# Patient Record
Sex: MALE | Race: WHITE | NOT HISPANIC OR LATINO | URBAN - METROPOLITAN AREA
[De-identification: names, ages, dates, MRNs, and addresses within clinical notes are randomized per-mention and may not be internally consistent; named-entity substitution may affect disease eponyms.]

---

## 2017-01-05 ENCOUNTER — INFUSION - HEALTHEAST (OUTPATIENT)
Dept: INFUSION THERAPY | Facility: HOSPITAL | Age: 82
End: 2017-01-05

## 2017-01-06 ENCOUNTER — COMMUNICATION - HEALTHEAST (OUTPATIENT)
Dept: CARDIOLOGY | Facility: CLINIC | Age: 82
End: 2017-01-06

## 2017-01-06 DIAGNOSIS — I51.89 DIASTOLIC DYSFUNCTION: ICD-10-CM

## 2017-01-09 ENCOUNTER — INFUSION - HEALTHEAST (OUTPATIENT)
Dept: INFUSION THERAPY | Facility: HOSPITAL | Age: 82
End: 2017-01-09

## 2017-01-09 DIAGNOSIS — C34.90 LUNG CANCER (H): ICD-10-CM

## 2017-02-14 ENCOUNTER — INFUSION - HEALTHEAST (OUTPATIENT)
Dept: INFUSION THERAPY | Facility: HOSPITAL | Age: 82
End: 2017-02-14

## 2017-02-14 DIAGNOSIS — C34.32 CANCER OF LOWER LOBE OF LEFT LUNG (H): ICD-10-CM

## 2017-03-14 ENCOUNTER — INFUSION - HEALTHEAST (OUTPATIENT)
Dept: INFUSION THERAPY | Facility: HOSPITAL | Age: 82
End: 2017-03-14

## 2017-03-14 DIAGNOSIS — C34.32 CANCER OF LOWER LOBE OF LEFT LUNG (H): ICD-10-CM

## 2017-03-30 ENCOUNTER — RECORDS - HEALTHEAST (OUTPATIENT)
Dept: LAB | Facility: CLINIC | Age: 82
End: 2017-03-30

## 2017-03-30 LAB
CHOLEST SERPL-MCNC: 151 MG/DL
FASTING STATUS PATIENT QL REPORTED: ABNORMAL
HDLC SERPL-MCNC: 32 MG/DL
LDLC SERPL CALC-MCNC: 67 MG/DL
TRIGL SERPL-MCNC: 258 MG/DL

## 2017-04-10 ENCOUNTER — HOSPITAL ENCOUNTER (OUTPATIENT)
Dept: CT IMAGING | Facility: HOSPITAL | Age: 82
Setting detail: RADIATION/ONCOLOGY SERIES
Discharge: STILL ADMITTED OR ADMINISTRATIVE | End: 2017-04-10
Attending: INTERNAL MEDICINE

## 2017-04-10 DIAGNOSIS — Z23 NEED FOR PNEUMOCOCCAL VACCINE: ICD-10-CM

## 2017-04-11 ENCOUNTER — AMBULATORY - HEALTHEAST (OUTPATIENT)
Dept: INFUSION THERAPY | Facility: HOSPITAL | Age: 82
End: 2017-04-11

## 2017-04-11 ENCOUNTER — OFFICE VISIT - HEALTHEAST (OUTPATIENT)
Dept: ONCOLOGY | Facility: HOSPITAL | Age: 82
End: 2017-04-11

## 2017-04-11 DIAGNOSIS — Z23 NEED FOR PNEUMOCOCCAL VACCINE: ICD-10-CM

## 2017-04-11 DIAGNOSIS — C34.32 CANCER OF LOWER LOBE OF LEFT LUNG (H): ICD-10-CM

## 2017-04-11 DIAGNOSIS — C34.31 MALIGNANT NEOPLASM OF LOWER LOBE, RIGHT BRONCHUS OR LUNG (H): ICD-10-CM

## 2017-04-11 ASSESSMENT — MIFFLIN-ST. JEOR: SCORE: 1324

## 2017-04-14 ENCOUNTER — COMMUNICATION - HEALTHEAST (OUTPATIENT)
Dept: ONCOLOGY | Facility: CLINIC | Age: 82
End: 2017-04-14

## 2017-04-14 ENCOUNTER — COMMUNICATION - HEALTHEAST (OUTPATIENT)
Dept: ONCOLOGY | Facility: HOSPITAL | Age: 82
End: 2017-04-14

## 2017-04-19 ENCOUNTER — COMMUNICATION - HEALTHEAST (OUTPATIENT)
Dept: ONCOLOGY | Facility: HOSPITAL | Age: 82
End: 2017-04-19

## 2017-04-28 ENCOUNTER — AMBULATORY - HEALTHEAST (OUTPATIENT)
Dept: CARDIOLOGY | Facility: CLINIC | Age: 82
End: 2017-04-28

## 2017-05-16 ENCOUNTER — COMMUNICATION - HEALTHEAST (OUTPATIENT)
Dept: CARDIOLOGY | Facility: CLINIC | Age: 82
End: 2017-05-16

## 2017-05-16 DIAGNOSIS — I50.30 DIASTOLIC HEART FAILURE (H): ICD-10-CM

## 2017-05-23 ENCOUNTER — INFUSION - HEALTHEAST (OUTPATIENT)
Dept: INFUSION THERAPY | Facility: HOSPITAL | Age: 82
End: 2017-05-23

## 2017-05-23 DIAGNOSIS — Z98.890 HISTORY OF VASCULAR ACCESS DEVICE: ICD-10-CM

## 2017-06-21 ENCOUNTER — COMMUNICATION - HEALTHEAST (OUTPATIENT)
Dept: ADMINISTRATIVE | Facility: HOSPITAL | Age: 82
End: 2017-06-21

## 2017-07-03 ENCOUNTER — COMMUNICATION - HEALTHEAST (OUTPATIENT)
Dept: ADMINISTRATIVE | Facility: HOSPITAL | Age: 82
End: 2017-07-03

## 2017-07-06 ENCOUNTER — AMBULATORY - HEALTHEAST (OUTPATIENT)
Dept: CARDIOLOGY | Facility: CLINIC | Age: 82
End: 2017-07-06

## 2017-07-07 ENCOUNTER — INFUSION - HEALTHEAST (OUTPATIENT)
Dept: INFUSION THERAPY | Facility: HOSPITAL | Age: 82
End: 2017-07-07

## 2017-07-07 DIAGNOSIS — C34.32 CANCER OF LOWER LOBE OF LEFT LUNG (H): ICD-10-CM

## 2017-07-10 ENCOUNTER — AMBULATORY - HEALTHEAST (OUTPATIENT)
Dept: CARDIOLOGY | Facility: CLINIC | Age: 82
End: 2017-07-10

## 2017-07-10 ENCOUNTER — OFFICE VISIT - HEALTHEAST (OUTPATIENT)
Dept: CARDIOLOGY | Facility: CLINIC | Age: 82
End: 2017-07-10

## 2017-07-10 DIAGNOSIS — I48.20 CHRONIC ATRIAL FIBRILLATION (H): ICD-10-CM

## 2017-07-10 ASSESSMENT — MIFFLIN-ST. JEOR: SCORE: 1349.85

## 2017-07-17 ENCOUNTER — RECORDS - HEALTHEAST (OUTPATIENT)
Dept: ADMINISTRATIVE | Facility: OTHER | Age: 82
End: 2017-07-17

## 2017-08-04 ENCOUNTER — COMMUNICATION - HEALTHEAST (OUTPATIENT)
Dept: ONCOLOGY | Facility: HOSPITAL | Age: 82
End: 2017-08-04

## 2017-08-21 ENCOUNTER — INFUSION - HEALTHEAST (OUTPATIENT)
Dept: INFUSION THERAPY | Facility: HOSPITAL | Age: 82
End: 2017-08-21

## 2017-08-21 DIAGNOSIS — Z98.890 HISTORY OF VASCULAR ACCESS DEVICE: ICD-10-CM

## 2017-09-13 ENCOUNTER — RECORDS - HEALTHEAST (OUTPATIENT)
Dept: ADMINISTRATIVE | Facility: OTHER | Age: 82
End: 2017-09-13

## 2017-09-26 ENCOUNTER — INFUSION - HEALTHEAST (OUTPATIENT)
Dept: INFUSION THERAPY | Facility: HOSPITAL | Age: 82
End: 2017-09-26

## 2017-09-26 DIAGNOSIS — Z98.890 HISTORY OF VASCULAR ACCESS DEVICE: ICD-10-CM

## 2017-10-06 ENCOUNTER — HOSPITAL ENCOUNTER (OUTPATIENT)
Dept: CT IMAGING | Facility: HOSPITAL | Age: 82
Setting detail: RADIATION/ONCOLOGY SERIES
Discharge: STILL ADMITTED OR ADMINISTRATIVE | End: 2017-10-06
Attending: INTERNAL MEDICINE

## 2017-10-06 ENCOUNTER — COMMUNICATION - HEALTHEAST (OUTPATIENT)
Dept: ONCOLOGY | Facility: HOSPITAL | Age: 82
End: 2017-10-06

## 2017-10-06 ENCOUNTER — HOSPITAL ENCOUNTER (OUTPATIENT)
Dept: CT IMAGING | Facility: HOSPITAL | Age: 82
Discharge: HOME | End: 2017-10-06
Attending: INTERNAL MEDICINE

## 2017-10-06 DIAGNOSIS — Z23 NEED FOR PNEUMOCOCCAL VACCINE: ICD-10-CM

## 2017-10-06 DIAGNOSIS — C34.31 MALIGNANT NEOPLASM OF LOWER LOBE, RIGHT BRONCHUS OR LUNG (H): ICD-10-CM

## 2017-10-10 ENCOUNTER — OFFICE VISIT - HEALTHEAST (OUTPATIENT)
Dept: ONCOLOGY | Facility: HOSPITAL | Age: 82
End: 2017-10-10

## 2017-10-10 ENCOUNTER — AMBULATORY - HEALTHEAST (OUTPATIENT)
Dept: INFUSION THERAPY | Facility: HOSPITAL | Age: 82
End: 2017-10-10

## 2017-10-10 DIAGNOSIS — Z23 NEED FOR PNEUMOCOCCAL VACCINE: ICD-10-CM

## 2017-10-10 DIAGNOSIS — C34.32 CANCER OF LOWER LOBE OF LEFT LUNG (H): ICD-10-CM

## 2017-10-10 DIAGNOSIS — C80.1 CANCER (H): ICD-10-CM

## 2017-10-10 DIAGNOSIS — C34.31 MALIGNANT NEOPLASM OF LOWER LOBE, RIGHT BRONCHUS OR LUNG (H): ICD-10-CM

## 2017-10-10 LAB
IGA SERPL-MCNC: 256 MG/DL (ref 65–400)
IGA SERPL-MCNC: 934 MG/DL (ref 700–1700)
IGM SERPL-MCNC: 239 MG/DL (ref 60–280)

## 2017-10-11 ENCOUNTER — COMMUNICATION - HEALTHEAST (OUTPATIENT)
Dept: ONCOLOGY | Facility: CLINIC | Age: 82
End: 2017-10-11

## 2017-10-11 LAB
ALBUMIN PERCENT: 60.1 % (ref 51–67)
ALBUMIN SERPL ELPH-MCNC: 4.2 G/DL (ref 3.2–4.7)
ALPHA 1 PERCENT: 2.7 % (ref 2–4)
ALPHA 2 PERCENT: 8.5 % (ref 5–13)
ALPHA1 GLOB SERPL ELPH-MCNC: 0.2 G/DL (ref 0.1–0.3)
ALPHA2 GLOB SERPL ELPH-MCNC: 0.6 G/DL (ref 0.4–0.9)
B-GLOBULIN SERPL ELPH-MCNC: 1.2 G/DL (ref 0.7–1.2)
BETA PERCENT: 17.8 % (ref 10–17)
GAMMA GLOB SERPL ELPH-MCNC: 0.8 G/DL (ref 0.6–1.4)
GAMMA GLOBULIN PERCENT: 10.9 % (ref 9–20)
KAPPA FREE LIGHT CHAIN, S - HISTORICAL: 2.39 MG/DL (ref 0.33–1.94)
KAPPA/LAMBDA FLC RATIO - HISTORICAL: 0.77 (ref 0.26–1.65)
LAMBDA FREE LIGHT CHAIN, S - HISTORICAL: 3.11 MG/DL (ref 0.57–2.63)
M PROTEIN SERPL ELPH-MCNC: 0.3 G/DL
PATH ICD:: ABNORMAL
PROT PATTERN SERPL ELPH-IMP: ABNORMAL
PROT SERPL-MCNC: 7 G/DL (ref 6–8)
REVIEWING PATHOLOGIST: ABNORMAL

## 2017-10-16 ENCOUNTER — COMMUNICATION - HEALTHEAST (OUTPATIENT)
Dept: CARDIOLOGY | Facility: CLINIC | Age: 82
End: 2017-10-16

## 2017-10-16 DIAGNOSIS — I48.21 PERMANENT ATRIAL FIBRILLATION (H): ICD-10-CM

## 2017-10-20 ENCOUNTER — COMMUNICATION - HEALTHEAST (OUTPATIENT)
Dept: CARDIOLOGY | Facility: CLINIC | Age: 82
End: 2017-10-20

## 2017-10-20 DIAGNOSIS — I48.21 PERMANENT ATRIAL FIBRILLATION (H): ICD-10-CM

## 2017-11-21 ENCOUNTER — COMMUNICATION - HEALTHEAST (OUTPATIENT)
Dept: ONCOLOGY | Facility: HOSPITAL | Age: 82
End: 2017-11-21

## 2017-11-30 ENCOUNTER — AMBULATORY - HEALTHEAST (OUTPATIENT)
Dept: MEDSURG UNIT | Facility: HOSPITAL | Age: 82
End: 2017-11-30

## 2017-12-12 ENCOUNTER — AMBULATORY - HEALTHEAST (OUTPATIENT)
Dept: CARDIOLOGY | Facility: CLINIC | Age: 82
End: 2017-12-12

## 2017-12-12 ENCOUNTER — RECORDS - HEALTHEAST (OUTPATIENT)
Dept: ADMINISTRATIVE | Facility: OTHER | Age: 82
End: 2017-12-12

## 2017-12-28 ENCOUNTER — OFFICE VISIT - HEALTHEAST (OUTPATIENT)
Dept: CARDIOLOGY | Facility: CLINIC | Age: 82
End: 2017-12-28

## 2017-12-28 DIAGNOSIS — I50.32 CHRONIC DIASTOLIC HEART FAILURE (H): ICD-10-CM

## 2017-12-28 ASSESSMENT — MIFFLIN-ST. JEOR: SCORE: 1309.03

## 2017-12-29 ENCOUNTER — COMMUNICATION - HEALTHEAST (OUTPATIENT)
Dept: CARDIOLOGY | Facility: CLINIC | Age: 82
End: 2017-12-29

## 2018-01-01 ENCOUNTER — INFUSION - HEALTHEAST (OUTPATIENT)
Dept: INFUSION THERAPY | Facility: HOSPITAL | Age: 83
End: 2018-01-01

## 2018-01-01 ENCOUNTER — HOSPITAL ENCOUNTER (OUTPATIENT)
Dept: CT IMAGING | Facility: HOSPITAL | Age: 83
Setting detail: RADIATION/ONCOLOGY SERIES
Discharge: STILL ADMITTED OR ADMINISTRATIVE | End: 2018-11-02
Attending: INTERNAL MEDICINE

## 2018-01-01 ENCOUNTER — COMMUNICATION - HEALTHEAST (OUTPATIENT)
Dept: ADMINISTRATIVE | Facility: CLINIC | Age: 83
End: 2018-01-01

## 2018-01-01 ENCOUNTER — COMMUNICATION - HEALTHEAST (OUTPATIENT)
Dept: CARDIOLOGY | Facility: CLINIC | Age: 83
End: 2018-01-01

## 2018-01-01 ENCOUNTER — RECORDS - HEALTHEAST (OUTPATIENT)
Dept: LAB | Facility: CLINIC | Age: 83
End: 2018-01-01

## 2018-01-01 ENCOUNTER — OFFICE VISIT - HEALTHEAST (OUTPATIENT)
Dept: ONCOLOGY | Facility: HOSPITAL | Age: 83
End: 2018-01-01

## 2018-01-01 ENCOUNTER — RECORDS - HEALTHEAST (OUTPATIENT)
Dept: ADMINISTRATIVE | Facility: OTHER | Age: 83
End: 2018-01-01

## 2018-01-01 ENCOUNTER — AMBULATORY - HEALTHEAST (OUTPATIENT)
Dept: ONCOLOGY | Facility: HOSPITAL | Age: 83
End: 2018-01-01

## 2018-01-01 ENCOUNTER — AMBULATORY - HEALTHEAST (OUTPATIENT)
Dept: MEDSURG UNIT | Facility: HOSPITAL | Age: 83
End: 2018-01-01

## 2018-01-01 ENCOUNTER — OFFICE VISIT - HEALTHEAST (OUTPATIENT)
Dept: CARDIOLOGY | Facility: CLINIC | Age: 83
End: 2018-01-01

## 2018-01-01 ENCOUNTER — COMMUNICATION - HEALTHEAST (OUTPATIENT)
Dept: ONCOLOGY | Facility: HOSPITAL | Age: 83
End: 2018-01-01

## 2018-01-01 ENCOUNTER — AMBULATORY - HEALTHEAST (OUTPATIENT)
Dept: INFUSION THERAPY | Facility: HOSPITAL | Age: 83
End: 2018-01-01

## 2018-01-01 ENCOUNTER — ANESTHESIA - HEALTHEAST (OUTPATIENT)
Dept: SURGERY | Facility: HOSPITAL | Age: 83
End: 2018-01-01

## 2018-01-01 ENCOUNTER — SURGERY - HEALTHEAST (OUTPATIENT)
Dept: SURGERY | Facility: HOSPITAL | Age: 83
End: 2018-01-01

## 2018-01-01 ENCOUNTER — HOME CARE/HOSPICE - HEALTHEAST (OUTPATIENT)
Dept: HOME HEALTH SERVICES | Facility: HOME HEALTH | Age: 83
End: 2018-01-01

## 2018-01-01 ENCOUNTER — AMBULATORY - HEALTHEAST (OUTPATIENT)
Dept: CARDIOLOGY | Facility: CLINIC | Age: 83
End: 2018-01-01

## 2018-01-01 DIAGNOSIS — I48.91 ATRIAL FIBRILLATION WITH RAPID VENTRICULAR RESPONSE (H): ICD-10-CM

## 2018-01-01 DIAGNOSIS — C34.32 CANCER OF LOWER LOBE OF LEFT LUNG (H): ICD-10-CM

## 2018-01-01 DIAGNOSIS — I50.32 CHRONIC DIASTOLIC HEART FAILURE (H): ICD-10-CM

## 2018-01-01 DIAGNOSIS — Z98.890 HISTORY OF VASCULAR ACCESS DEVICE: ICD-10-CM

## 2018-01-01 LAB
ALBUMIN PERCENT: 55.6 % (ref 51–67)
ALBUMIN SERPL ELPH-MCNC: 2.8 G/DL (ref 3.2–4.7)
ALBUMIN SERPL-MCNC: 2.5 G/DL (ref 3.5–5)
ALP SERPL-CCNC: 81 U/L (ref 45–120)
ALPHA 1 PERCENT: 5.2 % (ref 2–4)
ALPHA 2 PERCENT: 14.7 % (ref 5–13)
ALPHA1 GLOB SERPL ELPH-MCNC: 0.3 G/DL (ref 0.1–0.3)
ALPHA2 GLOB SERPL ELPH-MCNC: 0.7 G/DL (ref 0.4–0.9)
ALT SERPL W P-5'-P-CCNC: 22 U/L (ref 0–45)
ANION GAP SERPL CALCULATED.3IONS-SCNC: 12 MMOL/L (ref 5–18)
ANION GAP SERPL CALCULATED.3IONS-SCNC: 13 MMOL/L (ref 5–18)
ANION GAP SERPL CALCULATED.3IONS-SCNC: 13 MMOL/L (ref 5–18)
ANION GAP SERPL CALCULATED.3IONS-SCNC: 8 MMOL/L (ref 5–18)
AST SERPL W P-5'-P-CCNC: 15 U/L (ref 0–40)
B-GLOBULIN SERPL ELPH-MCNC: 0.6 G/DL (ref 0.7–1.2)
BASOPHILS # BLD AUTO: 0 THOU/UL (ref 0–0.2)
BASOPHILS NFR BLD AUTO: 0 % (ref 0–2)
BETA PERCENT: 12.5 % (ref 10–17)
BILIRUB SERPL-MCNC: 1.6 MG/DL (ref 0–1)
BNP SERPL-MCNC: 199 PG/ML (ref 0–93)
BUN SERPL-MCNC: 15 MG/DL (ref 8–28)
BUN SERPL-MCNC: 15 MG/DL (ref 8–28)
BUN SERPL-MCNC: 21 MG/DL (ref 8–28)
BUN SERPL-MCNC: 27 MG/DL (ref 8–28)
CALCIUM SERPL-MCNC: 8.7 MG/DL (ref 8.5–10.5)
CALCIUM SERPL-MCNC: 8.7 MG/DL (ref 8.5–10.5)
CALCIUM SERPL-MCNC: 9.3 MG/DL (ref 8.5–10.5)
CALCIUM SERPL-MCNC: 9.4 MG/DL (ref 8.5–10.5)
CHLORIDE BLD-SCNC: 102 MMOL/L (ref 98–107)
CHLORIDE BLD-SCNC: 96 MMOL/L (ref 98–107)
CHLORIDE BLD-SCNC: 98 MMOL/L (ref 98–107)
CHLORIDE BLD-SCNC: 99 MMOL/L (ref 98–107)
CHOLEST SERPL-MCNC: 105 MG/DL
CO2 SERPL-SCNC: 28 MMOL/L (ref 22–31)
CO2 SERPL-SCNC: 31 MMOL/L (ref 22–31)
CREAT SERPL-MCNC: 0.87 MG/DL (ref 0.7–1.3)
CREAT SERPL-MCNC: 0.87 MG/DL (ref 0.7–1.3)
CREAT SERPL-MCNC: 0.91 MG/DL (ref 0.7–1.3)
CREAT SERPL-MCNC: 0.97 MG/DL (ref 0.7–1.3)
CREAT UR-MCNC: 99.9 MG/DL
EOSINOPHIL # BLD AUTO: 0.1 THOU/UL (ref 0–0.4)
EOSINOPHIL NFR BLD AUTO: 1 % (ref 0–6)
ERYTHROCYTE [DISTWIDTH] IN BLOOD BY AUTOMATED COUNT: 15.6 % (ref 11–14.5)
FASTING STATUS PATIENT QL REPORTED: ABNORMAL
GAMMA GLOB SERPL ELPH-MCNC: 0.6 G/DL (ref 0.6–1.4)
GAMMA GLOBULIN PERCENT: 12 % (ref 9–20)
GFR SERPL CREATININE-BSD FRML MDRD: >60 ML/MIN/1.73M2
GLUCOSE BLD-MCNC: 115 MG/DL (ref 70–125)
GLUCOSE BLD-MCNC: 117 MG/DL (ref 70–125)
GLUCOSE BLD-MCNC: 160 MG/DL (ref 70–125)
GLUCOSE BLD-MCNC: 180 MG/DL (ref 70–125)
HCT VFR BLD AUTO: 36.7 % (ref 40–54)
HDLC SERPL-MCNC: 33 MG/DL
HGB BLD-MCNC: 12.3 G/DL (ref 14–18)
INR PPP: 5.04 (ref 0.9–1.1)
KAPPA LC FREE SER-MCNC: 1.31 MG/DL (ref 0.33–1.94)
KAPPA LC FREE/LAMBDA FREE SER NEPH: 0.53 {RATIO} (ref 0.26–1.65)
LAMBDA LC FREE SERPL-MCNC: 2.47 MG/DL (ref 0.57–2.63)
LDLC SERPL CALC-MCNC: 47 MG/DL
LYMPHOCYTES # BLD AUTO: 0.5 THOU/UL (ref 0.8–4.4)
LYMPHOCYTES NFR BLD AUTO: 4 % (ref 20–40)
M PROTEIN SERPL ELPH-MCNC: 0.1 G/DL
MCH RBC QN AUTO: 29.9 PG (ref 27–34)
MCHC RBC AUTO-ENTMCNC: 33.5 G/DL (ref 32–36)
MCV RBC AUTO: 89 FL (ref 80–100)
MICROALBUMIN UR-MCNC: 3.17 MG/DL (ref 0–1.99)
MICROALBUMIN/CREAT UR: 31.7 MG/G
MONOCYTES # BLD AUTO: 1.1 THOU/UL (ref 0–0.9)
MONOCYTES NFR BLD AUTO: 9 % (ref 2–10)
NEUTROPHILS # BLD AUTO: 10.4 THOU/UL (ref 2–7.7)
NEUTROPHILS NFR BLD AUTO: 86 % (ref 50–70)
PATH ICD:: ABNORMAL
PLATELET # BLD AUTO: 128 THOU/UL (ref 140–440)
PMV BLD AUTO: 10.5 FL (ref 8.5–12.5)
POTASSIUM BLD-SCNC: 3.6 MMOL/L (ref 3.5–5)
POTASSIUM BLD-SCNC: 3.7 MMOL/L (ref 3.5–5)
POTASSIUM BLD-SCNC: 4 MMOL/L (ref 3.5–5)
POTASSIUM BLD-SCNC: 4 MMOL/L (ref 3.5–5)
PROT PATTERN SERPL ELPH-IMP: ABNORMAL
PROT SERPL-MCNC: 5.1 G/DL (ref 6–8)
PROT SERPL-MCNC: 5.4 G/DL (ref 6–8)
RBC # BLD AUTO: 4.12 MILL/UL (ref 4.4–6.2)
REVIEWING PATHOLOGIST: ABNORMAL
SODIUM SERPL-SCNC: 137 MMOL/L (ref 136–145)
SODIUM SERPL-SCNC: 138 MMOL/L (ref 136–145)
SODIUM SERPL-SCNC: 139 MMOL/L (ref 136–145)
SODIUM SERPL-SCNC: 142 MMOL/L (ref 136–145)
TRIGL SERPL-MCNC: 127 MG/DL
WBC: 12.4 THOU/UL (ref 4–11)

## 2018-01-01 RX ORDER — DIGOXIN 125 UG/1
TABLET ORAL
Qty: 90 TABLET | Refills: 0 | Status: SHIPPED | OUTPATIENT
Start: 2018-01-01

## 2018-01-01 ASSESSMENT — MIFFLIN-ST. JEOR
SCORE: 1298.14
SCORE: 1327.17
SCORE: 1267.29
SCORE: 1331.7
SCORE: 1289.97
SCORE: 1279.54
SCORE: 1275.01
SCORE: 1336.24

## 2018-01-02 ENCOUNTER — COMMUNICATION - HEALTHEAST (OUTPATIENT)
Dept: CARDIOLOGY | Facility: CLINIC | Age: 83
End: 2018-01-02

## 2018-01-02 DIAGNOSIS — I48.91 ATRIAL FIBRILLATION WITH RAPID VENTRICULAR RESPONSE (H): ICD-10-CM

## 2018-01-05 ENCOUNTER — INFUSION - HEALTHEAST (OUTPATIENT)
Dept: INFUSION THERAPY | Facility: HOSPITAL | Age: 83
End: 2018-01-05

## 2018-01-05 DIAGNOSIS — Z98.890 HISTORY OF VASCULAR ACCESS DEVICE: ICD-10-CM

## 2018-02-13 ENCOUNTER — INFUSION - HEALTHEAST (OUTPATIENT)
Dept: INFUSION THERAPY | Facility: HOSPITAL | Age: 83
End: 2018-02-13

## 2018-02-13 DIAGNOSIS — Z95.828 PORT CATHETER IN PLACE: ICD-10-CM

## 2018-02-13 DIAGNOSIS — C34.90 LUNG CANCER (H): ICD-10-CM

## 2018-02-15 ENCOUNTER — RECORDS - HEALTHEAST (OUTPATIENT)
Dept: ADMINISTRATIVE | Facility: OTHER | Age: 83
End: 2018-02-15

## 2018-02-15 ENCOUNTER — AMBULATORY - HEALTHEAST (OUTPATIENT)
Dept: CARDIOLOGY | Facility: CLINIC | Age: 83
End: 2018-02-15

## 2018-02-20 ENCOUNTER — OFFICE VISIT - HEALTHEAST (OUTPATIENT)
Dept: CARDIOLOGY | Facility: CLINIC | Age: 83
End: 2018-02-20

## 2018-02-20 DIAGNOSIS — I48.20 CHRONIC ATRIAL FIBRILLATION (H): ICD-10-CM

## 2018-02-20 ASSESSMENT — MIFFLIN-ST. JEOR: SCORE: 1312.54

## 2018-02-22 ENCOUNTER — COMMUNICATION - HEALTHEAST (OUTPATIENT)
Dept: CARDIOLOGY | Facility: CLINIC | Age: 83
End: 2018-02-22

## 2018-02-22 DIAGNOSIS — I50.30 DIASTOLIC HEART FAILURE (H): ICD-10-CM

## 2018-03-26 ENCOUNTER — AMBULATORY - HEALTHEAST (OUTPATIENT)
Dept: SLEEP MEDICINE | Facility: CLINIC | Age: 83
End: 2018-03-26

## 2018-03-26 DIAGNOSIS — G47.33 OBSTRUCTIVE SLEEP APNEA: ICD-10-CM

## 2018-03-27 ENCOUNTER — INFUSION - HEALTHEAST (OUTPATIENT)
Dept: INFUSION THERAPY | Facility: HOSPITAL | Age: 83
End: 2018-03-27

## 2018-03-27 DIAGNOSIS — Z95.828 PORT CATHETER IN PLACE: ICD-10-CM

## 2018-04-06 ENCOUNTER — HOSPITAL ENCOUNTER (OUTPATIENT)
Dept: CT IMAGING | Facility: HOSPITAL | Age: 83
Setting detail: RADIATION/ONCOLOGY SERIES
Discharge: STILL ADMITTED OR ADMINISTRATIVE | End: 2018-04-06
Attending: INTERNAL MEDICINE

## 2018-04-06 ENCOUNTER — COMMUNICATION - HEALTHEAST (OUTPATIENT)
Dept: ONCOLOGY | Facility: HOSPITAL | Age: 83
End: 2018-04-06

## 2018-04-06 DIAGNOSIS — C34.32 CANCER OF LOWER LOBE OF LEFT LUNG (H): ICD-10-CM

## 2018-04-06 LAB
CREAT BLD-MCNC: 0.9 MG/DL
POC GFR AMER AF HE - HISTORICAL: >60 ML/MIN/1.73M2
POC GFR NON AMER AF HE - HISTORICAL: >60 ML/MIN/1.73M2

## 2018-04-10 ENCOUNTER — OFFICE VISIT - HEALTHEAST (OUTPATIENT)
Dept: ONCOLOGY | Facility: HOSPITAL | Age: 83
End: 2018-04-10

## 2018-04-10 ENCOUNTER — AMBULATORY - HEALTHEAST (OUTPATIENT)
Dept: INFUSION THERAPY | Facility: HOSPITAL | Age: 83
End: 2018-04-10

## 2018-04-10 DIAGNOSIS — C34.32 CANCER OF LOWER LOBE OF LEFT LUNG (H): ICD-10-CM

## 2018-04-10 DIAGNOSIS — E11.65 TYPE 2 DIABETES MELLITUS WITH HYPERGLYCEMIA, WITHOUT LONG-TERM CURRENT USE OF INSULIN (H): ICD-10-CM

## 2018-04-10 LAB
ALBUMIN SERPL-MCNC: 3.5 G/DL (ref 3.5–5)
ALP SERPL-CCNC: 103 U/L (ref 45–120)
ALT SERPL W P-5'-P-CCNC: 19 U/L (ref 0–45)
ANION GAP SERPL CALCULATED.3IONS-SCNC: 11 MMOL/L (ref 5–18)
AST SERPL W P-5'-P-CCNC: 13 U/L (ref 0–40)
BASOPHILS # BLD AUTO: 0 THOU/UL (ref 0–0.2)
BASOPHILS NFR BLD AUTO: 0 % (ref 0–2)
BILIRUB SERPL-MCNC: 1.9 MG/DL (ref 0–1)
BUN SERPL-MCNC: 18 MG/DL (ref 8–28)
CALCIUM SERPL-MCNC: 8.9 MG/DL (ref 8.5–10.5)
CHLORIDE BLD-SCNC: 95 MMOL/L (ref 98–107)
CO2 SERPL-SCNC: 32 MMOL/L (ref 22–31)
CREAT SERPL-MCNC: 1.19 MG/DL (ref 0.7–1.3)
EOSINOPHIL # BLD AUTO: 0.2 THOU/UL (ref 0–0.4)
EOSINOPHIL NFR BLD AUTO: 2 % (ref 0–6)
ERYTHROCYTE [DISTWIDTH] IN BLOOD BY AUTOMATED COUNT: 14.5 % (ref 11–14.5)
GFR SERPL CREATININE-BSD FRML MDRD: 58 ML/MIN/1.73M2
GLUCOSE BLD-MCNC: 539 MG/DL (ref 70–125)
HCT VFR BLD AUTO: 38 % (ref 40–54)
HGB BLD-MCNC: 12.8 G/DL (ref 14–18)
LYMPHOCYTES # BLD AUTO: 1 THOU/UL (ref 0.8–4.4)
LYMPHOCYTES NFR BLD AUTO: 14 % (ref 20–40)
MCH RBC QN AUTO: 30.8 PG (ref 27–34)
MCHC RBC AUTO-ENTMCNC: 33.7 G/DL (ref 32–36)
MCV RBC AUTO: 92 FL (ref 80–100)
MONOCYTES # BLD AUTO: 0.5 THOU/UL (ref 0–0.9)
MONOCYTES NFR BLD AUTO: 7 % (ref 2–10)
NEUTROPHILS # BLD AUTO: 5.9 THOU/UL (ref 2–7.7)
NEUTROPHILS NFR BLD AUTO: 77 % (ref 50–70)
PLATELET # BLD AUTO: 157 THOU/UL (ref 140–440)
PMV BLD AUTO: 10.2 FL (ref 8.5–12.5)
POTASSIUM BLD-SCNC: 3.9 MMOL/L (ref 3.5–5)
PROT SERPL-MCNC: 6.4 G/DL (ref 6–8)
RBC # BLD AUTO: 4.15 MILL/UL (ref 4.4–6.2)
SODIUM SERPL-SCNC: 138 MMOL/L (ref 136–145)
WBC: 7.7 THOU/UL (ref 4–11)

## 2018-04-11 ENCOUNTER — HOSPITAL ENCOUNTER (OUTPATIENT)
Dept: PET IMAGING | Facility: HOSPITAL | Age: 83
Setting detail: RADIATION/ONCOLOGY SERIES
Discharge: STILL ADMITTED OR ADMINISTRATIVE | End: 2018-04-11
Attending: INTERNAL MEDICINE

## 2018-04-11 ENCOUNTER — AMBULATORY - HEALTHEAST (OUTPATIENT)
Dept: ONCOLOGY | Facility: HOSPITAL | Age: 83
End: 2018-04-11

## 2018-04-11 DIAGNOSIS — C34.32 CANCER OF LOWER LOBE OF LEFT LUNG (H): ICD-10-CM

## 2018-04-11 LAB
GLUCOSE BLDC GLUCOMTR-MCNC: 348 MG/DL
GLUCOSE BLDC GLUCOMTR-MCNC: 374 MG/DL

## 2018-04-11 ASSESSMENT — MIFFLIN-ST. JEOR: SCORE: 1284.08

## 2018-04-13 ENCOUNTER — COMMUNICATION - HEALTHEAST (OUTPATIENT)
Dept: ONCOLOGY | Facility: HOSPITAL | Age: 83
End: 2018-04-13

## 2018-04-13 ENCOUNTER — HOSPITAL ENCOUNTER (OUTPATIENT)
Dept: PET IMAGING | Facility: HOSPITAL | Age: 83
Setting detail: RADIATION/ONCOLOGY SERIES
Discharge: STILL ADMITTED OR ADMINISTRATIVE | End: 2018-04-13
Attending: INTERNAL MEDICINE

## 2018-04-13 LAB — GLUCOSE BLDC GLUCOMTR-MCNC: 362 MG/DL

## 2018-04-18 ENCOUNTER — AMBULATORY - HEALTHEAST (OUTPATIENT)
Dept: EDUCATION SERVICES | Facility: CLINIC | Age: 83
End: 2018-04-18

## 2018-04-18 DIAGNOSIS — E11.65 UNCONTROLLED TYPE 2 DIABETES MELLITUS WITH HYPERGLYCEMIA, UNSPECIFIED LONG TERM INSULIN USE STATUS: ICD-10-CM

## 2018-04-20 ENCOUNTER — HOSPITAL ENCOUNTER (OUTPATIENT)
Dept: PET IMAGING | Facility: HOSPITAL | Age: 83
Setting detail: RADIATION/ONCOLOGY SERIES
Discharge: STILL ADMITTED OR ADMINISTRATIVE | End: 2018-04-20
Attending: INTERNAL MEDICINE

## 2018-04-20 LAB
GLUCOSE BLDC GLUCOMTR-MCNC: 213 MG/DL
GLUCOSE BLDC GLUCOMTR-MCNC: 216 MG/DL

## 2018-04-24 ENCOUNTER — RECORDS - HEALTHEAST (OUTPATIENT)
Dept: ADMINISTRATIVE | Facility: OTHER | Age: 83
End: 2018-04-24

## 2018-04-26 ENCOUNTER — COMMUNICATION - HEALTHEAST (OUTPATIENT)
Dept: ONCOLOGY | Facility: HOSPITAL | Age: 83
End: 2018-04-26

## 2018-04-27 ENCOUNTER — HOSPITAL ENCOUNTER (OUTPATIENT)
Dept: PET IMAGING | Facility: HOSPITAL | Age: 83
Setting detail: RADIATION/ONCOLOGY SERIES
Discharge: STILL ADMITTED OR ADMINISTRATIVE | End: 2018-04-27
Attending: INTERNAL MEDICINE

## 2018-04-27 ENCOUNTER — COMMUNICATION - HEALTHEAST (OUTPATIENT)
Dept: ONCOLOGY | Facility: HOSPITAL | Age: 83
End: 2018-04-27

## 2018-04-27 LAB — GLUCOSE BLDC GLUCOMTR-MCNC: 137 MG/DL

## 2018-05-01 ENCOUNTER — OFFICE VISIT - HEALTHEAST (OUTPATIENT)
Dept: ONCOLOGY | Facility: HOSPITAL | Age: 83
End: 2018-05-01

## 2018-05-01 DIAGNOSIS — C34.32 CANCER OF LOWER LOBE OF LEFT LUNG (H): ICD-10-CM

## 2018-05-01 DIAGNOSIS — D47.2 MGUS (MONOCLONAL GAMMOPATHY OF UNKNOWN SIGNIFICANCE): ICD-10-CM

## 2018-05-01 DIAGNOSIS — J43.2 CENTRILOBULAR EMPHYSEMA (H): ICD-10-CM

## 2018-05-02 ENCOUNTER — OFFICE VISIT - HEALTHEAST (OUTPATIENT)
Dept: SLEEP MEDICINE | Facility: CLINIC | Age: 83
End: 2018-05-02

## 2018-05-02 ENCOUNTER — AMBULATORY - HEALTHEAST (OUTPATIENT)
Dept: SLEEP MEDICINE | Facility: CLINIC | Age: 83
End: 2018-05-02

## 2018-05-02 DIAGNOSIS — G47.33 OSA ON CPAP: ICD-10-CM

## 2018-05-02 ASSESSMENT — MIFFLIN-ST. JEOR: SCORE: 1320.36

## 2018-05-10 ENCOUNTER — COMMUNICATION - HEALTHEAST (OUTPATIENT)
Dept: ONCOLOGY | Facility: HOSPITAL | Age: 83
End: 2018-05-10

## 2018-05-10 DIAGNOSIS — E11.9 DIABETES (H): ICD-10-CM

## 2018-05-22 ENCOUNTER — INFUSION - HEALTHEAST (OUTPATIENT)
Dept: INFUSION THERAPY | Facility: HOSPITAL | Age: 83
End: 2018-05-22

## 2018-05-22 DIAGNOSIS — Z98.890 HISTORY OF VASCULAR ACCESS DEVICE: ICD-10-CM

## 2019-01-01 ENCOUNTER — HOME CARE/HOSPICE - HEALTHEAST (OUTPATIENT)
Dept: HOSPICE | Facility: HOSPICE | Age: 84
End: 2019-01-01

## 2019-01-01 ENCOUNTER — INFUSION - HEALTHEAST (OUTPATIENT)
Dept: INFUSION THERAPY | Facility: HOSPITAL | Age: 84
End: 2019-01-01

## 2019-01-01 ENCOUNTER — COMMUNICATION - HEALTHEAST (OUTPATIENT)
Dept: RESPIRATORY THERAPY | Facility: HOSPITAL | Age: 84
End: 2019-01-01

## 2019-01-01 ENCOUNTER — AMBULATORY - HEALTHEAST (OUTPATIENT)
Dept: INFUSION THERAPY | Facility: HOSPITAL | Age: 84
End: 2019-01-01

## 2019-01-01 ENCOUNTER — AMBULATORY - HEALTHEAST (OUTPATIENT)
Dept: MEDSURG UNIT | Facility: HOSPITAL | Age: 84
End: 2019-01-01

## 2019-01-01 ENCOUNTER — COMMUNICATION - HEALTHEAST (OUTPATIENT)
Dept: ADMINISTRATIVE | Facility: CLINIC | Age: 84
End: 2019-01-01

## 2019-01-01 ENCOUNTER — RECORDS - HEALTHEAST (OUTPATIENT)
Dept: ADMINISTRATIVE | Facility: OTHER | Age: 84
End: 2019-01-01

## 2019-01-01 ENCOUNTER — HOSPITAL ENCOUNTER (OUTPATIENT)
Dept: CT IMAGING | Facility: HOSPITAL | Age: 84
Setting detail: RADIATION/ONCOLOGY SERIES
Discharge: STILL ADMITTED OR ADMINISTRATIVE | End: 2019-03-04
Attending: INTERNAL MEDICINE

## 2019-01-01 ENCOUNTER — OFFICE VISIT - HEALTHEAST (OUTPATIENT)
Dept: ONCOLOGY | Facility: HOSPITAL | Age: 84
End: 2019-01-01

## 2019-01-01 ENCOUNTER — RECORDS - HEALTHEAST (OUTPATIENT)
Dept: LAB | Facility: CLINIC | Age: 84
End: 2019-01-01

## 2019-01-01 ENCOUNTER — COMMUNICATION - HEALTHEAST (OUTPATIENT)
Dept: CARDIOLOGY | Facility: CLINIC | Age: 84
End: 2019-01-01

## 2019-01-01 DIAGNOSIS — C34.32 CANCER OF LOWER LOBE OF LEFT LUNG (H): ICD-10-CM

## 2019-01-01 DIAGNOSIS — J41.0 SIMPLE CHRONIC BRONCHITIS (H): ICD-10-CM

## 2019-01-01 DIAGNOSIS — I50.30 DIASTOLIC HEART FAILURE (H): ICD-10-CM

## 2019-01-01 DIAGNOSIS — Z98.890 HISTORY OF VASCULAR ACCESS DEVICE: ICD-10-CM

## 2019-01-01 LAB
ALBUMIN SERPL-MCNC: 3.6 G/DL (ref 3.5–5)
ALP SERPL-CCNC: 66 U/L (ref 45–120)
ALT SERPL W P-5'-P-CCNC: 17 U/L (ref 0–45)
ANION GAP SERPL CALCULATED.3IONS-SCNC: 17 MMOL/L (ref 5–18)
ANION GAP SERPL CALCULATED.3IONS-SCNC: 8 MMOL/L (ref 5–18)
AST SERPL W P-5'-P-CCNC: 14 U/L (ref 0–40)
BASOPHILS # BLD AUTO: 0 THOU/UL (ref 0–0.2)
BASOPHILS NFR BLD AUTO: 0 % (ref 0–2)
BILIRUB SERPL-MCNC: 1.8 MG/DL (ref 0–1)
BUN SERPL-MCNC: 16 MG/DL (ref 8–28)
BUN SERPL-MCNC: 20 MG/DL (ref 8–28)
CALCIUM SERPL-MCNC: 8.9 MG/DL (ref 8.5–10.5)
CALCIUM SERPL-MCNC: 9.5 MG/DL (ref 8.5–10.5)
CHLORIDE BLD-SCNC: 100 MMOL/L (ref 98–107)
CHLORIDE BLD-SCNC: 101 MMOL/L (ref 98–107)
CO2 SERPL-SCNC: 29 MMOL/L (ref 22–31)
CO2 SERPL-SCNC: 34 MMOL/L (ref 22–31)
CREAT BLD-MCNC: 0.9 MG/DL
CREAT SERPL-MCNC: 0.85 MG/DL (ref 0.7–1.3)
CREAT SERPL-MCNC: 1.14 MG/DL (ref 0.7–1.3)
EOSINOPHIL # BLD AUTO: 0 THOU/UL (ref 0–0.4)
EOSINOPHIL NFR BLD AUTO: 0 % (ref 0–6)
ERYTHROCYTE [DISTWIDTH] IN BLOOD BY AUTOMATED COUNT: 18.1 % (ref 11–14.5)
GFR SERPL CREATININE-BSD FRML MDRD: >60 ML/MIN/1.73M2
GFR SERPL CREATININE-BSD FRML MDRD: >60 ML/MIN/1.73M2
GLUCOSE BLD-MCNC: 139 MG/DL (ref 70–125)
GLUCOSE BLD-MCNC: 256 MG/DL (ref 70–125)
HCT VFR BLD AUTO: 35.6 % (ref 40–54)
HGB BLD-MCNC: 11.4 G/DL (ref 14–18)
LYMPHOCYTES # BLD AUTO: 0.6 THOU/UL (ref 0.8–4.4)
LYMPHOCYTES NFR BLD AUTO: 6 % (ref 20–40)
MCH RBC QN AUTO: 30.9 PG (ref 27–34)
MCHC RBC AUTO-ENTMCNC: 32 G/DL (ref 32–36)
MCV RBC AUTO: 97 FL (ref 80–100)
MONOCYTES # BLD AUTO: 0.3 THOU/UL (ref 0–0.9)
MONOCYTES NFR BLD AUTO: 3 % (ref 2–10)
NEUTROPHILS # BLD AUTO: 9.3 THOU/UL (ref 2–7.7)
NEUTROPHILS NFR BLD AUTO: 91 % (ref 50–70)
PLATELET # BLD AUTO: 183 THOU/UL (ref 140–440)
PMV BLD AUTO: 9.3 FL (ref 8.5–12.5)
POC GFR AMER AF HE - HISTORICAL: >60 ML/MIN/1.73M2
POC GFR NON AMER AF HE - HISTORICAL: >60 ML/MIN/1.73M2
POTASSIUM BLD-SCNC: 4.2 MMOL/L (ref 3.5–5)
POTASSIUM BLD-SCNC: 4.3 MMOL/L (ref 3.5–5)
PROT SERPL-MCNC: 6 G/DL (ref 6–8)
RBC # BLD AUTO: 3.69 MILL/UL (ref 4.4–6.2)
SODIUM SERPL-SCNC: 143 MMOL/L (ref 136–145)
SODIUM SERPL-SCNC: 146 MMOL/L (ref 136–145)
WBC: 10.4 THOU/UL (ref 4–11)

## 2021-05-25 ENCOUNTER — RECORDS - HEALTHEAST (OUTPATIENT)
Dept: ADMINISTRATIVE | Facility: CLINIC | Age: 86
End: 2021-05-25

## 2021-05-27 NOTE — PROGRESS NOTES
4/15/19  Reviewed the chart in the morning. Order was signed and notes entered. We have everything we need to set the patient up.   10:03am- Called patient to offer choice and he was okay with using [facility] again for his oxygen use, but he wants less equipment this time. Explained the POC and he said that would be fine. Told him we will deliver the equipment to him at the hospital prior to discharge. He understood.   10:34am- Spoke with patient's nurse, Rachael. Told her we did receive the call yesterday for the oxygen and have everything we need, we are just waiting on staff to be available to deliver, so expect oxygen sometime between 12-1pm. She understood.

## 2021-05-27 NOTE — PROGRESS NOTES
Received intake call for home oxygen at 10:54am. Reviewed patient's chart; Patient qualifies under Medicare guidelines, but the order is not signed and there are no notes in the chart ( except the H&P)    11:13am- Spoke with respiratory therapist, Olya, confirmed we received the intake, but it had been noted that the patient is not leaving until tomorrow. She confirmed. It was changed as she was calling it in. I told her we would follow up on it tomorrow morning to make sure he is going home, but in the meantime I do need the order to be signed and I need face to face notes entered. She understood and was going to let the physician know to do those today. She confirmed that the patient does want to use us for services.  Will follow up on 4/15/19 with patient about equipment and to verify all documentation is completed.

## 2021-05-27 NOTE — TELEPHONE ENCOUNTER
COPD Education    Called patient and left message. Told patient to call if he had any questions and that we would call again next month.   Our phone number is [phone].    SERGIO Kwan, COPD educator

## 2021-05-30 ENCOUNTER — RECORDS - HEALTHEAST (OUTPATIENT)
Dept: ADMINISTRATIVE | Facility: CLINIC | Age: 86
End: 2021-05-30

## 2021-05-30 VITALS — WEIGHT: 167.3 LBS | HEIGHT: 63 IN | BODY MASS INDEX: 29.64 KG/M2

## 2021-05-31 VITALS — BODY MASS INDEX: 30.65 KG/M2 | HEIGHT: 63 IN | WEIGHT: 173 LBS

## 2021-05-31 VITALS — WEIGHT: 164 LBS | HEIGHT: 63 IN | BODY MASS INDEX: 29.06 KG/M2

## 2021-05-31 VITALS — WEIGHT: 172.5 LBS | BODY MASS INDEX: 30.56 KG/M2

## 2021-06-01 VITALS — HEIGHT: 62 IN | WEIGHT: 170 LBS | BODY MASS INDEX: 31.28 KG/M2

## 2021-06-01 VITALS — WEIGHT: 162.8 LBS | BODY MASS INDEX: 29.54 KG/M2

## 2021-06-01 VITALS — BODY MASS INDEX: 30.8 KG/M2 | WEIGHT: 167.4 LBS | HEIGHT: 62 IN

## 2021-06-01 VITALS — HEIGHT: 62 IN | BODY MASS INDEX: 29.81 KG/M2 | WEIGHT: 162 LBS

## 2021-06-01 VITALS — WEIGHT: 168 LBS | BODY MASS INDEX: 30.73 KG/M2

## 2021-06-02 VITALS — WEIGHT: 158.3 LBS | BODY MASS INDEX: 29.13 KG/M2 | HEIGHT: 62 IN

## 2021-06-02 VITALS — BODY MASS INDEX: 29.39 KG/M2 | WEIGHT: 160.7 LBS

## 2021-06-02 VITALS — BODY MASS INDEX: 29.44 KG/M2 | HEIGHT: 62 IN | WEIGHT: 160 LBS

## 2021-06-02 VITALS — WEIGHT: 163.7 LBS | BODY MASS INDEX: 29.94 KG/M2

## 2021-06-03 VITALS — WEIGHT: 160.05 LBS | BODY MASS INDEX: 28.35 KG/M2

## 2021-06-05 ENCOUNTER — RECORDS - HEALTHEAST (OUTPATIENT)
Dept: ONCOLOGY | Facility: HOSPITAL | Age: 86
End: 2021-06-05

## 2021-06-05 DIAGNOSIS — C34.90 MALIGNANT NEOPLASM OF BRONCHUS AND LUNG (H): ICD-10-CM

## 2021-06-10 NOTE — PROGRESS NOTES
[facility] Progress Note    Patient: Jeremy Martinez  MRN: [id number]  Date of Service: April 11, 2017      Assessment and Plan:    Malignant neoplasm of bronchus and lung, unspecified site    Primary site: Lung    Staging method: AJCC 7th Edition    Pathologic: Stage IIB (T3, N0, cM0) - Signed by Shahnaz Avendano CNP on 8/8/2014    Summary: Stage IIB (T3, N0, cM0)    1. Squamous cell carcinoma of the lung: We reviewed his CAT scan images. They are stable. No evidence of progression. Clinically he is stable. We will see him back in 6 months.  We will repeat imaging at that time.  At that time he will be 5 years out from completion of his treatment.    2. Monoclonal gammopathy of undetermined significance:  M protein level remains stable and quite small.    3. Left-sided neck mass: Masses in the parotid and posterior to the thyroid are stable.  We will continue to follow these with imaging.  Only plan to intervene if they are enlarging.    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 Diagnosed 12/2011. Stage T3 N0 M0 with chest wall invasion.    Treatment:    Left lower lobe lobectomy and partial chest wall resection on 2/08/2012. He had adjuvant chemotherapy with carboplatin and paclitaxel for 2 cycles followed by radiation completed in 6/2012 followed by 2 more cycles of carbo Taxol, finishing in 8/2012.    Interim History:    Jeremy returns today for follow-up visit.  He was seen 6 months ago.  Overall things are stable.  Continues to have cough productive of whitish sputum.  No hemoptysis.  No worsening shortness of breath.  No chest pain.  No new areas of bony pain.  No headaches.    Review of Systems:    Constitutional  Constitutional (WDL): Exceptions to WDL  Fatigue: Fatigue not relieved by rest - Limiting  "instrumental ADL  Weight Gain: 5 - <10% from baseline (up 6lbs since 10/2016)  Neurosensory  Neurosensory (WDL): Exceptions to WDL  Peripheral Sensory Neuropathy: Asymptomatic, loss of deep tendon reflexes or paresthesia (both hands and feet - unchanged)  Cardiovascular  Cardiovascular (WDL): All cardiovascular elements are within defined limits  Pulmonary  Respiratory (WDL): Exceptions to WDL  Cough: Mild symptoms, nonprescription intervention indicated (clear productuve cough)  Dyspnea: Shortness of breath with moderate exertion  Gastrointestinal  Gastrointestinal (WDL): All gastrointestinal elements are within defined limits  Genitourinary  Genitourinary (WDL): All genitourinary elements are within defined limits  Integumentary  Integumentary (WDL): All integumentary elements are within defined limits  Patient Coping  Patient Coping: Accepting  Accompanied by  Accompanied by: Family Member    Past History:    Past Medical History:   Diagnosis Date     A-fib      Basal cell carcinoma      COPD (chronic obstructive pulmonary disease)      Heart disease      HLD (hyperlipidemia)      HTN (hypertension)      Lung cancer      MGUS (monoclonal gammopathy of unknown significance)      Myocardial infarction     age 38     Sleep apnea      Warthin's tumor     Left parotid     Physical Exam:    Recent Vitals 4/11/2017   Height 5' 3\"   Weight 167 lbs 5 oz   BSA (m2) 1.84 m2   /61   Pulse 82   Temp 97.9   Temp src 1   SpO2 95     General: patient appears stated age of 84 y.o.. Nontoxic and in no distress.   HEENT: Head: atraumatic, normocephalic. Sclerae anicteric.  Chest:  Normal respiratory effort  Cardiac:  No edema.   Abdomen: abdomen is soft, non-distended  Extremities: normal tone and muscle bulk.  Skin: no lesions or rash. Warm and dry.   CNS: alert and oriented x3. Grossly non-focal.   Psychiatric: normal mood and affect.     Lab Results:    Recent Results (from the past 168 hour(s))   Comprehensive Metabolic " Panel   Result Value Ref Range    Sodium 138 136 - 145 mmol/L    Potassium 3.9 3.5 - 5.0 mmol/L    Chloride 101 98 - 107 mmol/L    CO2 29 22 - 31 mmol/L    Anion Gap, Calculation 8 5 - 18 mmol/L    Glucose 91 70 - 125 mg/dL    BUN 17 8 - 28 mg/dL    Creatinine 0.99 0.70 - 1.30 mg/dL    GFR MDRD Af Amer >60 >60 mL/min/1.73m2    GFR MDRD Non Af Amer >60 >60 mL/min/1.73m2    Bilirubin, Total 1.1 (H) 0.0 - 1.0 mg/dL    Calcium 9.5 8.5 - 10.5 mg/dL    Protein, Total 7.0 6.0 - 8.0 g/dL    Albumin 3.8 3.5 - 5.0 g/dL    Alkaline Phosphatase 87 45 - 120 U/L    AST 15 0 - 40 U/L    ALT 18 0 - 45 U/L   HM1 (CBC with Diff)   Result Value Ref Range    WBC 8.2 4.0 - 11.0 thou/uL    RBC 3.79 (L) 4.40 - 6.20 mill/uL    Hemoglobin 12.0 (L) 14.0 - 18.0 g/dL    Hematocrit 35.3 (L) 40.0 - 54.0 %    MCV 93 80 - 100 fL    MCH 31.7 27.0 - 34.0 pg    MCHC 34.0 32.0 - 36.0 g/dL    RDW 14.9 (H) 11.0 - 14.5 %    Platelets 169 140 - 440 thou/uL    MPV 9.8 8.5 - 12.5 fL    Neutrophils % 70 50 - 70 %    Lymphocytes % 16 (L) 20 - 40 %    Monocytes % 11 (H) 2 - 10 %    Eosinophils % 4 0 - 6 %    Basophils % 1 0 - 2 %    Neutrophils Absolute 5.7 2.0 - 7.7 thou/uL    Lymphocytes Absolute 1.3 0.8 - 4.4 thou/uL    Monocytes Absolute 0.9 0.0 - 0.9 thou/uL    Eosinophils Absolute 0.3 0.0 - 0.4 thou/uL    Basophils Absolute 0.0 0.0 - 0.2 thou/uL      Imaging:    CT neck images were personally reviewed.  Stable scarring in the left lung.  No new nodules.    Ct Soft Tissue Neck With Contrast    Result Date: 4/10/2017  [facility] CT SOFT TISSUE NECK W CONTRAST 4/10/2017 10:49 AM INDICATION: Neck mass, history of lung primary malignancy. TECHNIQUE: CT neck performed with IV contrast. Axial imaging with coronal and sagittal reconstruction. Dose reduction techniques were used. IV CONTRAST: 100 mL Omnipaque 350 COMPARISON: Soft tissue neck CT examinations, last performed 10/10/2016. FINDINGS:  Mucosal surfaces of the upper aerodigestive tract are  symmetrical and unremarkable, without discrete mass. The parapharyngeal and  spaces are unremarkable. The oral cavity and floor of mouth structures are symmetrical and unremarkable. There has been no significant interval change in morphology or size of a lobular left parotid tail hyperdense lesion, currently measuring 20 mm craniocaudal x 14 mm AP x 16 mm transverse. The right parotid and bilateral submandibular glands  are unremarkable in appearance. The larynx is unremarkable. No new or enlarging cervical lymphadenopathy. Stable nonspecific clustered nodes in the left level IV heidy zone. A heterogeneously enhancing heidy mass is demonstrated adjacent to the left thyroid lobe, measuring 19 mm craniocaudal by 10.5 mm transverse, previously 21 x 10 mm. Mild calcified atherosclerotic plaque involving the carotid bifurcations. The included orbital and intracranial compartments are unremarkable. The visualized portions of the paranasal sinuses are clear. The mastoid air cells and middle ear cavities are clear. Moderate degenerative disc disease of the cervical spine, with resultant mild to moderate spinal canal stenosis at C5-C6 and C6-C7. The included lung parenchyma demonstrates surgical changes of left lower lobectomy, with redemonstration of left apical bulla. Pulmonary findings are better detailed on dedicated chest, abdomen and pelvis CT examination dated 04/10/2017.     CONCLUSION: 1.  No significant interval change in morphology or size of a lobular left parotid tail hyperdense lesion measuring up to 20 mm, compatible with primary salivary gland neoplasm. 2.  Grossly stable lesion adjacent to the left lobe of the thyroid gland, as compared to 10/10/2016 soft tissue neck CT. Differential considerations again include metastatic lymph node versus parathyroid lesion. 3.  No new or enlarging cervical lymphadenopathy.    Ct Chest Abdomen Pelvis Without Oral With Iv Contrast    Result Date: 4/10/2017  CT  CHEST, ABDOMEN, AND PELVIS 4/10/2017 10:50 AM      INDICATION: lung cancer follow-up TECHNIQUE: CT chest, abdomen, and pelvis. Dose reduction techniques were used. IV CONTRAST: Iohexol (Omni) 100 mL COMPARISON: 10/10/2016, 04/07/2016, 10/08/2015, 04/09/2015, 10/09/2014. FINDINGS: CHEST: Stable chronic surgical changes of left lower lobectomy with reconstruction of the posterior medial left chest wall with surgical mesh. Consolidation and volume loss of the posterior left upper lobe associated with pleural thickening near the mesh  unchanged from prior studies. 3 mm nodules of the right middle lobe and left lower lobe are unchanged (e.G. images 69 and 76 of series 3). Small soft tissue density paraspinal lesions measuring 2.9 x 2.1 cm on the right at T10 and 2.2 x 1.4 cm on the right and 1.6 x 1.0 cm on the left at T11 are unchanged (e.G. images 140 and 145 of series 2). Chronic underlying emphysema and pulmonary fibrosis similar to prior. Multivessel coronary artery calcifications. Normal heart size. Left subclavian venous Port-A-Cath terminates in the superior vena cava.  ABDOMEN: Hepatic steatosis. 12 mm ill-defined subcapsular focus of enhancement of subcapsular segment IV of the liver unchanged from prior, possibly a perfusion anomaly. A few small sharply circumscribed hypodense lesions of the liver measuring up to 10 mm in the left lobe are also stable and probably cysts. Spleen borderline enlarged measuring about 13 cm. Cholelithiasis. Stable 11 mm right adrenal adenoma. Subcentimeter hypodensity of the right kidney probably a benign cyst and unchanged. Pancreas, left adrenal gland, and left kidney negative. Ectatic abdominal aorta measuring up to 2.9 cm distally and is unchanged. PELVIS: Colonic diverticulosis. No free fluid or lymphadenopathy. MUSCULOSKELETAL: Thoracotomy changes of the posterior left chest wall and ribs. Bilateral gynecomastia. No change in small dense sclerotic lesions of both iliac wings,  probably bone islands. Degenerative changes of the spine.     CONCLUSION: 1.  Stable appearance of the torso. 2.  Postoperative changes of left chest wall and left lower lobectomy. 3.  Stable soft tissue paraspinal lesions at T10 and T11. 4.  Stable tiny bilateral pulmonary nodules. 5.  Other findings as discussed above.      Signed by: Jeremy Lockhart MD

## 2021-06-11 NOTE — PROGRESS NOTES
"Cardiology Progress Note    Assessment:  Chronic atrial fibrillation, on warfarin, good rate control  History of atrial flutter, status post ablation in 2011  Heart failure with preserved ejection fraction, stable  Spironolactone induced gynecomastia, improving  Severe COPD  History of squamous cell carcinoma of the lung in 2011  Obstructive sleep apnea  Hyperlipidemia      Plan:  He appears to be fairly stable from cardiac standpoint.  I do not think we need to make medication changes now.  I will see him for follow-up in 6 months    Subjective:   This is 84 y.o. male who comes in today for follow-up visit.  He reports no change to shortness of breath.  He is always dyspneic primarily due to severe COPD.  He denies chest pains.  He has not felt any heart palpitations.  He denies syncope.  His weight has been stable.    Review of Systems:   General: WNL  Eyes: Visual Distubance  Ears/Nose/Throat: Hearing Loss  Lungs: Shortness of Breath, Wheezing  Heart: Shortness of Breath with activity, Irregular Heartbeat  Stomach: WNL  Bladder: Frequent Urination at Night  Muscle/Joints: WNL  Skin: WNL  Nervous System: WNL  Mental Health: WNL     Blood: WNL    Objective:   /52 (Patient Site: Right Arm, Patient Position: Sitting, Cuff Size: Adult Large)  Pulse 68  Resp 16  Ht 5' 3\" (1.6 m)  Wt 173 lb (78.5 kg)  BMI 30.65 kg/m2  Physical Exam:  GENERAL: no distress  NECK: No JVD  LUNGS: Decreased breath sounds bilaterally  CARDIAC: irregular rhythm, S1 & S2 normal.  No heaves, thrills, gallops or murmurs.  ABDOMEN: flat, negative hepatosplenomegaly, soft and non-tender.  EXTREMITIES: No evidence of cyanosis, clubbing or edema.    Current Outpatient Prescriptions   Medication Sig Dispense Refill     albuterol (PROAIR HFA) 90 mcg/actuation inhaler Inhale 2 puffs every 4 (four) hours as needed for wheezing. 1 Inhaler 3     albuterol (PROVENTIL) 2.5 mg /3 mL (0.083 %) nebulizer solution Take 2.5 mg by nebulization every 6 " (six) hours as needed.        atorvastatin (LIPITOR) 10 MG tablet Take 1 tablet (10 mg total) by mouth bedtime. 90 tablet 4     beclomethasone (QVAR) 80 mcg/actuation inhaler Inhale 1 puff 2 (two) times a day.       digoxin (LANOXIN) 125 mcg tablet Take 1 tablet (125 mcg total) by mouth daily. 30 tablet 12     diltiazem (CARDIZEM SR) 60 MG 12 hr capsule Take 1 capsule (60 mg total) by mouth 2 (two) times a day. 180 capsule 3     furosemide (LASIX) 40 MG tablet Take 1 tablet (40 mg total) by mouth 2 (two) times a day. 60 tablet 3     furosemide (LASIX) 40 MG tablet TAKE ONE TABLET BY MOUTH TWICE DAILY 180 tablet 1     VIT C/HAM AC/LUT/COPPER/ZNOX (VIT C-VIT E-COPPER-ZNOX-LUTEIN ORAL) Take 1 tablet by mouth 2 times a day at 6:00 am and 4:00 pm.        WARFARIN SODIUM (WARFARIN ORAL) Take 3 mg by mouth See Admin Instructions. 1.5mg on Tuesday and Thursday and 3mg on M, W, F, Sa, Sun       tiotropium (SPIRIVA) 18 mcg inhalation capsule Place 1 capsule (2 puffs total) into inhaler and inhale once daily. 30 capsule 11     No current facility-administered medications for this visit.      Facility-Administered Medications Ordered in Other Visits   Medication Dose Route Frequency Provider Last Rate Last Dose     heparin 100 unit/mL lockflush (PF) porcine 300-600 Units  300-600 Units Intravenous PRN Jeremy Lockhart MD         sodium chloride 0.9 % flush 10 mL (NS)  10 mL Intravenous PRN Jeremy Lockhart MD           Cardiographics:    ECG: Atrial fibrillation      Echocardiogram: July 2016 1. Limited echocardiogram.  2. Normal left ventricular size and systolic performance. The ejection  fraction is estimated to be 60-65%.  3. The ventricular septum is sigmoid in appearance.  4. There is mild aortic valve sclerosis.  5. The left atrium is moderately enlarged.  The IVC appears dilated with mildly decreased phasic variation in caval  diameter c/w increased right atrial pressure.      Holter: September 2016 Persistent atrial  fibrillation with controlled ventricular response.      Stress Test: 2007 negative perfusion scan    Lab Results:       Lab Results   Component Value Date    CHOL 151 03/30/2017    CHOL 103 04/19/2016    CHOL 71 10/17/2014     Lab Results   Component Value Date    HDL 32 (L) 03/30/2017    HDL 33 (L) 04/19/2016    HDL 18 (L) 10/17/2014     Lab Results   Component Value Date    LDLCALC 67 03/30/2017    LDLCALC 49 04/19/2016    LDLCALC 24 10/17/2014     Lab Results   Component Value Date    TRIG 258 (H) 03/30/2017    TRIG 106 04/19/2016    TRIG 144 10/17/2014     No components found for: CHOLHDL  BNP   Date Value Ref Range Status   09/20/2016 593 (H) 0 - 93 pg/mL Final       Zaheer (Stan)  MD Angela

## 2021-06-12 NOTE — PROGRESS NOTES
Jeremy came to clinic this morning for a port flush only.  Port was accessed with good blood return.  Port flushed, heparinized, then deaccessed and site covered with gauze.  Jeremy d/c from clinic ambulatory and unaccompanied.

## 2021-06-13 NOTE — PROGRESS NOTES
Patient arrived ambulatory, by self, for port flush.  States that he had a flu shot at [facility].  Port accessed under aseptic technique - excellent blood return noted when flushed with NS.  Heparin instilled and needle dced.  Dressing applied.  Patient left ambulatory, by self, in stable condition.  States he will return in 6 weeks for port flush.  Instructed to call with questions/concerns/problems.  Patient verbalized understanding.

## 2021-06-14 NOTE — PROGRESS NOTES
Received intake call for home oxygen at 10:28am. Reviewed patient's chart; Patient qualifies under Medicare guidelines and all documentation is in the chart including a good order.   10:38am Called to offer choice and patient is okay with [facility] setting them up. Discussed equipment with patient and informed them that we would be to bedside with portable oxygen as soon as the order is signed along with instructions on how to contact us when they leave to complete set up at home.    10:40am- Spoke with respiratory therapist, Sarita, confirmed we received the order, and just needed the order signed in order to bring portability to the hospital. She understood and said she would page the doctor again.

## 2021-06-14 NOTE — PROGRESS NOTES
[facility] Hematology and Oncology Progress Note    Patient: Jeremy Martinez  MRN: [id number]  Date of Service:  October 10, 2017.      Assessment and Plan:    Malignant neoplasm of bronchus and lung, unspecified site    Primary site: Lung    Staging method: AJCC 7th Edition    Pathologic: Stage IIB (T3, N0, cM0) - Signed by Shahnaz Avendano CNP on 8/8/2014    Summary: Stage IIB (T3, N0, cM0)    1. Squamous cell carcinoma of the lung: Overall he is doing well.  No clinical evidence of recurrent disease.  CT scan images reviewed.  No evidence of recurrent disease.  Breathing is stable.  He is now just about 6 years out from his diagnosis. 5 years out from completion of his treatment.  We will see him back in 6 months with repeat imaging.    2. Monoclonal gammopathy of undetermined significance:  M protein level remains stable and quite small.  Next labs due in October 2018.    3. Left-sided neck mass: Masses in the parotid and posterior to the thyroid are stable.  We will continue to follow these with imaging.  Only plan to intervene if they are enlarging.    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 Diagnosed 12/2011. Stage T3 N0 M0 with chest wall invasion.    Treatment:    Left lower lobe lobectomy and partial chest wall resection on 2/08/2012. He had adjuvant chemotherapy with carboplatin and paclitaxel for 2 cycles followed by radiation completed in 6/2012 followed by 2 more cycles of carbo Taxol, finishing in 8/2012.    Interim History:    Jeremy returns today for follow-up visit.  Overall doing well.  Continues to have some cough and dyspnea on exertion.  Nothing is worse.  No concerns.  Energy and appetite are okay.  No unintentional weight loss.    Review of Systems:    Constitutional  Constitutional (WDL): Exceptions to WDL  Fatigue: Fatigue not  relieved by rest - Limiting instrumental ADL  Neurosensory  Neurosensory (WDL): Exceptions to WDL  Peripheral Motor Neuropathy: Asymptomatic, clinical or diagnostic observations only, intervention not indicated (hands and feet)  Peripheral Sensory Neuropathy: Asymptomatic, loss of deep tendon reflexes or paresthesia (hands and feet)  Cardiovascular  Cardiovascular (WDL): All cardiovascular elements are within defined limits  Pulmonary  Respiratory (WDL): Exceptions to WDL  Dyspnea: Shortness of breath with moderate exertion  Gastrointestinal  Gastrointestinal (WDL): All gastrointestinal elements are within defined limits  Genitourinary  Genitourinary (WDL): Exceptions to WDL  Urinary Frequency: Present (up a few times at night)  Integumentary  Integumentary (WDL): Exceptions to WDL (ear lobes; skin cancer per dermatology)  Patient Coping  Patient Coping: Accepting  Accompanied by  Accompanied by: Family Member (wife)    Past History:    Past Medical History:   Diagnosis Date     A-fib      Basal cell carcinoma      COPD (chronic obstructive pulmonary disease)      Heart disease      HLD (hyperlipidemia)      HTN (hypertension)      Lung cancer      MGUS (monoclonal gammopathy of unknown significance)      Myocardial infarction     age 38     Sleep apnea      Warthin's tumor     Left parotid     Physical Exam:    Recent Vitals 11/30/2017   Height -   Weight -   BSA (m2) -   /69   Pulse 70   Temp 98   Temp src 1   SpO2 94   Some recent data might be hidden     General: patient appears stated age of 84 y.o.. Nontoxic and in no distress.   HEENT: Head: atraumatic, normocephalic. Sclerae anicteric.  Chest:  Normal respiratory effort  Cardiac:  No edema.   Abdomen: abdomen is non-distended  Extremities: normal tone and muscle bulk.  Skin: no lesions or rash. Warm and dry.   CNS: alert and oriented x3. Grossly non-focal.   Psychiatric: normal mood and affect.     Lab Results:    Results for LIONEL LOWRY (MRN  [id number]) as of 12/12/2017 20:12   Ref. Range 10/10/2017 14:08 10/10/2017 14:08   Sodium Latest Ref Range: 136 - 145 mmol/L 140    Potassium Latest Ref Range: 3.5 - 5.0 mmol/L 3.4 (L)    Chloride Latest Ref Range: 98 - 107 mmol/L 97 (L)    CO2 Latest Ref Range: 22 - 31 mmol/L 36 (H)    Anion Gap, Calculation Latest Ref Range: 5 - 18 mmol/L 7    BUN Latest Ref Range: 8 - 28 mg/dL 15    Creatinine Latest Ref Range: 0.70 - 1.30 mg/dL 0.86    GFR MDRD Af Amer Latest Ref Range: >60 mL/min/1.73m2 >60    GFR MDRD Non Af Amer Latest Ref Range: >60 mL/min/1.73m2 >60    Calcium Latest Ref Range: 8.5 - 10.5 mg/dL 9.0    AST Latest Ref Range: 0 - 40 U/L 17    ALT Latest Ref Range: 0 - 45 U/L 19    ALBUMIN Latest Ref Range: 3.5 - 5.0 g/dL 3.7    Protein, Total Latest Ref Range: 6.0 - 8.0 g/dL 6.8 7.0   Alkaline Phosphatase Latest Ref Range: 45 - 120 U/L 90    Bilirubin, Total Latest Ref Range: 0.0 - 1.0 mg/dL 1.4 (H)    Glucose Latest Ref Range: 70 - 125 mg/dL 149 (H)    % Beta Latest Ref Range: 10.0 - 17.0 %  17.8 (H)   Albumin % Latest Ref Range: 51.0 - 67.0 %  60.1   Alpha 1 Latest Ref Range: 0.1 - 0.3 g/dL  0.2   Alpha 1 % Latest Ref Range: 2.0 - 4.0 %  2.7   Alpha 2 Latest Ref Range: 0.4 - 0.9 g/dL  0.6   Alpha 2 % Latest Ref Range: 5.0 - 13.0 %  8.5   Beta Latest Ref Range: 0.7 - 1.2 g/dL  1.2   ELP Comment Unknown  Small monoclonal ...   Gamma Globulin Latest Ref Range: 0.6 - 1.4 g/dL  0.8   Gamma Globulin % Latest Ref Range: 9.0 - 20.0 %  10.9   IGA Latest Ref Range: 65 - 400 mg/dL 256    Immunoglobulin M Latest Ref Range: 60 - 280 mg/dL 239    Kappa Free Light Chains Latest Ref Range: 0.3300 - 1.94 mg/dL 2.39 (H)    Lambda Free Light Chain Latest Ref Range: 0.5700 - 2.63 mg/dL 3.11 (H)    Monoclonal Peak Latest Units: g/dL  0.3   Immunoglobulin G Latest Ref Range: 700 - 1700 mg/dL 934    WBC Latest Ref Range: 4.0 - 11.0 thou/uL 6.2    RBC Latest Ref Range: 4.40 - 6.20 mill/uL 4.08 (L)    Hemoglobin Latest Ref Range:  14.0 - 18.0 g/dL 12.4 (L)    Hematocrit Latest Ref Range: 40.0 - 54.0 % 37.7 (L)    MCV Latest Ref Range: 80 - 100 fL 92    MCH Latest Ref Range: 27.0 - 34.0 pg 30.4    MCHC Latest Ref Range: 32.0 - 36.0 g/dL 32.9    RDW Latest Ref Range: 11.0 - 14.5 % 17.8 (H)    Platelets Latest Ref Range: 140 - 440 thou/uL 142    MPV Latest Ref Range: 8.5 - 12.5 fL 9.8    Neutrophils % Latest Ref Range: 50 - 70 % 73 (H)    Lymphocytes % Latest Ref Range: 20 - 40 % 15 (L)    Monocytes % Latest Ref Range: 2 - 10 % 9    Eosinophils % Latest Ref Range: 0 - 6 % 4    Basophils % Latest Ref Range: 0 - 2 % 1    Neutrophils Absolute Latest Ref Range: 2.0 - 7.7 thou/uL 4.5    Lymphocytes Absolute Latest Ref Range: 0.8 - 4.4 thou/uL 0.9    Monocytes Absolute Latest Ref Range: 0.0 - 0.9 thou/uL 0.5    Eosinophils Absolute Latest Ref Range: 0.0 - 0.4 thou/uL 0.2    Basophils Absolute Latest Ref Range: 0.0 - 0.2 thou/uL 0.0      No results found for this or any previous visit (from the past 168 hour(s)).     Imaging:    CT neck images were personally reviewed.  No evidence of progressive disease    CT CHEST, ABDOMEN, AND PELVIS  10/6/2017 10:55 AM  COMPARISON: 4/10/2017, 10/10/2016, 8/6/2014.     FINDINGS:   CHEST: Stable surgical changes of left lower lobectomy and reconstruction of the posterior left chest wall with surgical mesh. Chronic/atelectasis of the posterior left upper lobe and pleural thickening near the mesh. Tiny nodules of the right middle and   left lower lobe are stable. Circumscribed soft tissue paraspinal lesions are unchanged measuring 2.9 x 2.1 cm on the right at T10 and 2.3 x 1.5 cm on the right and 1.5 x 1.2 cm on the left at T11. Chronic underlying emphysema and pulmonary fibrosis   similar to previous. Coronary artery calcifications. Mild cardiomegaly. Left subclavian venous Port-A-Cath terminates near the superior cavoatrial junction.      ABDOMEN: Hepatic steatosis. No change in 12 mm subcapsular focus of enhancement  of segment IV, possibly a perfusion anomaly. A few stable 10 mm and smaller hypodense cysts. Cholelithiasis. No pericholecystic inflammation. Borderline splenomegaly as   before. 11 mm right adrenal nodule unchanged. Stable tiny probable cyst of kidney. Pancreas and left adrenal gland, and left kidney negative. Extensive atherosclerotic plaque of the aorta. Stable ectasia of the abdominal aorta measuring up to 2.9 cm the   infrarenal portion. No free fluid or lymphadenopathy.     PELVIS: Colonic diverticulosis. Normal appendix.     MUSCULOSKELETAL: No change in small sclerotic foci of the iliac bones which are likely bone islands. Degenerative changes of the spine. Gynecomastia.    CONCLUSION:  1.  Stable appearance of the chest, abdomen, and pelvis.  2.  Post surgical changes of the left chest wall and left lower lobectomy.  3.  Stable tiny bilateral pulmonary nodules.  4.  No change in soft tissue paraspinal lesions at T10 and T11.  5.  Other findings as discussed above.      Signed by: Jeremy Lockhart MD

## 2021-06-15 NOTE — PROGRESS NOTES
Assessment/Plan:     1. Heart failure with preserved ejection fraction, NYHA class III: Jeremy Martinez appears well compensated.  He continues to have dyspnea on exertion and fatigue.  He states this is improved since discharge.  He is not currently weighing himself which I instructed him to do so.  He states he follows a low-sodium diet.  No changes to medications.  He will continue taking Lasix 40 mg twice a day.    2. Hypertension: Controlled.  Blood pressure 119/64.  Continue current medications.    3.   Permanent atrial fibrillation: Rate controlled.  He is on diltiazem 60 mg twice a day and digoxin 125 mcg daily.  He is on warfarin for anticoagulation.  His INRs are managed by his primary doctor.    Follow-up with Dr. Miller in 6 weeks and the heart failure clinic as needed    Subjective:     Jeremy Martinez is seen at [facility] heart failure clinic today for post-hospitalization follow-up.  He was hospitalized at [facility] from November 26 to November 30, 2017 with shortness of breath.  His BNP was 440.  The most recent evaluation of His ejection fraction was 63% from an  echo on 11/27/2017.  His echocardiogram also showed mild to moderate aortic regurgitation, mild mitral regurgitation, mild to moderate tricuspid regurgitation.  Past medical history is also significant for hypertension, atrial fibrillation, dyslipidemia, COPD, diabetes and obstructive sleep apnea.  He also has a history of lung cancer.    Since being discharged from the hospital, Mohsen feels that he is improving.  He continues to have fatigue and dyspnea on exertion but states this has improved since discharge.  He denies any orthopnea or PND.  He has mild lower extremity edema.  He denies lightheadedness, shortness of breath, orthopnea, PND, palpitations, chest pain and abdominal fullness/bloating.      Jeremy Martinez s weight at discharge was 167 pounds.  He is not monitoring home weights.  He is following a low sodium diet.  His  clinic weight today is 164 pounds.    Medication reconciliation was done today.    Review of Systems:   General: WNL  Eyes: WNL  Ears/Nose/Throat: WNL  Lungs: WNL  Heart: WNL  Stomach: WNL  Bladder: WNL  Muscle/Joints: WNL  Skin: WNL  Nervous System: WNL  Mental Health: WNL     Blood: WNL    Patient Active Problem List   Diagnosis     HLD (hyperlipidemia)     Obstructive Sleep Apnea     Hypertension     Atrial Flutter     COPD (chronic obstructive pulmonary disease)     MGUS (monoclonal gammopathy of unknown significance)     Chronic atrial fibrillation     Atrial fibrillation with rapid ventricular response     Hyperglycemia     Acute respiratory failure with hypoxia     Diabetes mellitus type 2, uncontrolled     Cancer of lower lobe of left lung     Chronic diastolic heart failure     Sepsis     Pneumonia     COPD exacerbation     Weakness       Past Medical History:   Diagnosis Date     A-fib      Basal cell carcinoma      COPD (chronic obstructive pulmonary disease)      Heart disease      HLD (hyperlipidemia)      HTN (hypertension)      Lung cancer      MGUS (monoclonal gammopathy of unknown significance)      Myocardial infarction     age 38     Sleep apnea      Warthin's tumor     Left parotid       Past Surgical History:   Procedure Laterality Date     APPENDECTOMY       BASAL CELL CARCINOMA EXCISION  2009    back     CARDIOVERSION      x2     PORTACATH PLACEMENT  2012     RECTAL SURGERY      rectal fissure     THORACOTOMY Left 2012       Family History   Problem Relation Age of Onset     Lung cancer Sister      Aortic aneurysm Brother        Social History     Social History     Marital status:      Spouse name: N/A     Number of children: N/A     Years of education: N/A     Occupational History     Not on file.     Social History Main Topics     Smoking status: Former Smoker     Packs/day: 1.50     Years: 65.00     Quit date: 8/8/2011     Smokeless tobacco: Former User     Quit date: 8/8/2011       Comment: does occasionally have a cigarette when out drinking      Alcohol use 12.0 oz/week     20 Cans of beer per week      Comment: 2-3 beers a day     Drug use: No     Sexual activity: No     Other Topics Concern     Not on file     Social History Narrative       Current Outpatient Prescriptions   Medication Sig Dispense Refill     acetaminophen (TYLENOL) 325 MG tablet Take 2 tablets (650 mg total) by mouth every 4 (four) hours as needed.  0     albuterol (PROAIR HFA;PROVENTIL HFA;VENTOLIN HFA) 90 mcg/actuation inhaler Inhale 2 puffs every 4 (four) hours as needed for wheezing.       albuterol (PROVENTIL) 2.5 mg /3 mL (0.083 %) nebulizer solution Take 2.5 mg by nebulization every 6 (six) hours as needed.        atorvastatin (LIPITOR) 10 MG tablet Take 10 mg by mouth at bedtime.       beclomethasone (QVAR) 80 mcg/actuation inhaler Inhale 1 puff 2 (two) times a day.       digoxin (LANOXIN) 125 mcg tablet Take 1 tablet (125 mcg total) by mouth daily. 30 tablet 12     diltiazem (CARDIZEM SR) 60 MG 12 hr capsule Take 60 mg by mouth 2 (two) times a day.       furosemide (LASIX) 40 MG tablet Take 1 tablet (40 mg total) by mouth 2 (two) times a day. 60 tablet 3     VIT C/HAM AC/LUT/COPPER/ZNOX (VIT C-VIT E-COPPER-ZNOX-LUTEIN ORAL) Take 1 tablet by mouth 2 (two) times a day.        WARFARIN SODIUM (WARFARIN ORAL) Take 3 mg by mouth See Admin Instructions. 1.5mg on Tuesday and Thursday and 3mg on M, W, F, Sa, Sun       predniSONE (DELTASONE) 10 mg tablet Take 1 tablet (10 mg total) by mouth daily. 30 tablet 0     No current facility-administered medications for this visit.      Facility-Administered Medications Ordered in Other Visits   Medication Dose Route Frequency Provider Last Rate Last Dose     heparin 100 unit/mL lockflush (PF) porcine 300-600 Units  300-600 Units Intravenous PRN Jeremy Lockhart MD         sodium chloride 0.9 % flush 10 mL (NS)  10 mL Intravenous PRN Jeremy Lockhart MD           Allergies    Allergen Reactions     Prednisone Other (See Comments)     Doesn't remember if he is allergic to this --- 11/26/17 tolerated IV methylprednisolone          Objective:     Vitals:    12/28/17 1523   BP: 119/64   Pulse: 88   Resp: 20     Wt Readings from Last 3 Encounters:   12/28/17 164 lb (74.4 kg)   11/30/17 167 lb 8 oz (76 kg)   10/10/17 172 lb 8 oz (78.2 kg)       General Appearance:   Alert, cooperative and in no acute distress.   HEENT:  No scleral icterus; the mucous membranes were pink and moist.   Neck: JVP normal. No HJR   Chest: The spine was straight. The chest was symmetric.   Lungs:   Respirations unlabored; the lungs are clear to auscultation.   Cardiovascular:    Irregularly irregular. S1 and S2 without murmur, clicks or rubs. Radial and posterior tibial pulses are intact and symmetrical.    Abdomen:  Soft, nontender, nondistended, bowel sounds present   Extremities: No cyanosis. Trace bilateral lower extremity edema.   Skin: No xanthelasma.   Neurologic: Mood and affect are appropriate.         Lab Review   Lab Results   Component Value Date    CREATININE 0.93 12/22/2017    BUN 21 12/22/2017     12/22/2017    K 3.7 12/22/2017    CL 91 (L) 12/22/2017    CO2 35 (H) 12/22/2017     Lab Results   Component Value Date     (H) 11/27/2017     BNP (pg/mL)   Date Value   11/27/2017 459 (H)   11/26/2017 440 (H)   09/20/2016 593 (H)     Creatinine (mg/dL)   Date Value   12/22/2017 0.93   11/29/2017 1.01   11/28/2017 1.19   11/27/2017 1.25       Cardiographics  Echocardiogram: 11/27/2017  Summary     Severe left atrial enlargement    Left ventricle ejection fraction is normal. The calculated left ventricular ejection fraction is 63% without wall motion abnormality.    Aortic valve sclerosis with mild to moderate aortic regurgitation    Moderate mitral annular calcification. No mitral stenosis but mild regurgitation    Mild to moderate tricuspid regurgitation    When compared to the previous study  dated 7/5/2016, no significant change identified           20 minutes were face to face spent with the patient with greater than 50% spent on education and counseling.      Kelli Murrieta, [facility]   Heart Failure Clinic

## 2021-06-15 NOTE — PROGRESS NOTES
Jeremy came to clinic this afternoon for port flush only.  Port was accessed with good blood return.  Port was flushed with ns, then heparinized and deaccessed.  Site was covered.  Jeremy d/c from clinic ambulatory.  He is aware of his future appointment.

## 2021-06-16 PROBLEM — N13.8 URINARY TRACT OBSTRUCTION BY KIDNEY STONE: Status: ACTIVE | Noted: 2018-01-01

## 2021-06-16 PROBLEM — J44.1 COPD WITH ACUTE EXACERBATION (H): Status: ACTIVE | Noted: 2018-01-01

## 2021-06-16 PROBLEM — N13.5 UPJ (URETEROPELVIC JUNCTION) OBSTRUCTION: Status: ACTIVE | Noted: 2018-01-01

## 2021-06-16 PROBLEM — N20.0 URINARY TRACT OBSTRUCTION BY KIDNEY STONE: Status: ACTIVE | Noted: 2018-01-01

## 2021-06-16 PROBLEM — N20.1 RIGHT URETERAL STONE: Status: ACTIVE | Noted: 2018-01-01

## 2021-06-16 PROBLEM — R09.02 HYPOXIA: Status: ACTIVE | Noted: 2019-01-01

## 2021-06-16 PROBLEM — A41.9 SEPSIS (H): Status: ACTIVE | Noted: 2017-11-26

## 2021-06-16 NOTE — PROGRESS NOTES
Pt arrived ambulatory and was seated in chair 1. Reviewed plan of care and today's treatment. Accessed port a cath per sterile technique and obtained an excellent blood return, no labs are required for today. VSS. The AVS was given and explained to the patient. Left unit ambulatory and plans to return March 27th.    Yecenia Ratliff RN

## 2021-06-16 NOTE — PROGRESS NOTES
"Cardiology Progress Note    Assessment:  Permanent atrial fibrillation, on warfarin, good rate control  History of atrial flutter, status post ablation in 2011  Heart failure with preserved ejection fraction, euvolemic  Severe COPD  History of squamous cell carcinoma of the lung in 2011  Obstructive sleep apnea  Hyperlipidemia      Plan:  He appears to be fairly well compensated from the  cardiac standpoint.  I stressed importance of staying on diuretic.    Follow-up in 6 months    Subjective:   This is 84 y.o. male who comes in today for follow-up visit.  He was admitted to the hospital in November 2017 with COPD exacerbation.  BNP was elevated from baseline.  He was treated with diuretics.  He is back to his usual himself.  He is always short of breath but not any more than before.  His weight has been stable.  He has no PND and orthopnea.    Review of Systems:   General: WNL  Eyes: Visual Distubance  Ears/Nose/Throat: WNL  Lungs: WNL  Heart: WNL  Stomach: WNL  Bladder: Frequent Urination at Night  Muscle/Joints: WNL  Skin: WNL  Nervous System: Loss of Balance  Mental Health: Confusion, Depression     Blood: WNL    Objective:   /60 (Patient Site: Right Arm, Patient Position: Sitting, Cuff Size: Adult Regular)  Pulse 88  Resp 16  Ht 5' 2.25\" (1.581 m)  Wt 167 lb 6.4 oz (75.9 kg)  BMI 30.37 kg/m2  Physical Exam:  GENERAL: no distress  NECK: No JVD  LUNGS: Decreased breath sounds.  CARDIAC: irregular rhythm, S1 & S2 normal.  No heaves, thrills, gallops or murmurs.  ABDOMEN: flat, negative hepatosplenomegaly, soft and non-tender.  EXTREMITIES: No evidence of cyanosis, clubbing or edema.    Current Outpatient Prescriptions   Medication Sig Dispense Refill     acetaminophen (TYLENOL) 325 MG tablet Take 2 tablets (650 mg total) by mouth every 4 (four) hours as needed.  0     albuterol (PROAIR HFA;PROVENTIL HFA;VENTOLIN HFA) 90 mcg/actuation inhaler Inhale 2 puffs every 4 (four) hours as needed for wheezing.   "     albuterol (PROVENTIL) 2.5 mg /3 mL (0.083 %) nebulizer solution Take 2.5 mg by nebulization every 6 (six) hours as needed.        atorvastatin (LIPITOR) 10 MG tablet Take 10 mg by mouth at bedtime.       beclomethasone (QVAR) 80 mcg/actuation inhaler Inhale 1 puff 2 (two) times a day.       DIGOX 125 mcg tablet TAKE ONE TABLET BY MOUTH ONCE DAILY 90 tablet 1     diltiazem (CARDIZEM SR) 60 MG 12 hr capsule Take 60 mg by mouth 2 (two) times a day.       furosemide (LASIX) 40 MG tablet Take 1 tablet (40 mg total) by mouth 2 (two) times a day. 60 tablet 3     VIT C/HAM AC/LUT/COPPER/ZNOX (VIT C-VIT E-COPPER-ZNOX-LUTEIN ORAL) Take 1 tablet by mouth 2 (two) times a day.        WARFARIN SODIUM (WARFARIN ORAL) Take 3 mg by mouth See Admin Instructions. 1.5mg on Tuesday and Thursday and 3mg on M, W, F, Sa, Sun       predniSONE (DELTASONE) 10 mg tablet Take 1 tablet (10 mg total) by mouth daily. 30 tablet 0     No current facility-administered medications for this visit.      Facility-Administered Medications Ordered in Other Visits   Medication Dose Route Frequency Provider Last Rate Last Dose     heparin 100 unit/mL lockflush (PF) porcine 300-600 Units  300-600 Units Intravenous PRN Jeremy Lockhart MD         sodium chloride 0.9 % flush 10 mL (NS)  10 mL Intravenous PRN Jeremy Lockhart MD           Cardiographics:    ECG: Atrial fibrillation      Echocardiogram: November 2017     Severe left atrial enlargement    Left ventricle ejection fraction is normal. The calculated left ventricular ejection fraction is 63% without wall motion abnormality.    Aortic valve sclerosis with mild to moderate aortic regurgitation    Moderate mitral annular calcification. No mitral stenosis but mild regurgitation    Mild to moderate tricuspid regurgitation  When compared to the previous study dated 7/5/2016, no significant change identified     Holter: September 2016   Persistent atrial fibrillation with controlled ventricular  response.      Stress Test: 2007   negative perfusion scan    Lab Results:       Lab Results   Component Value Date    CHOL 151 03/30/2017    CHOL 103 04/19/2016    CHOL 71 10/17/2014     Lab Results   Component Value Date    HDL 32 (L) 03/30/2017    HDL 33 (L) 04/19/2016    HDL 18 (L) 10/17/2014     Lab Results   Component Value Date    LDLCALC 67 03/30/2017    LDLCALC 49 04/19/2016    LDLCALC 24 10/17/2014     Lab Results   Component Value Date    TRIG 258 (H) 03/30/2017    TRIG 106 04/19/2016    TRIG 144 10/17/2014     No components found for: CHOLHDL  BNP   Date Value Ref Range Status   11/27/2017 459 (H) 0 - 93 pg/mL Final       Zaheer (Stan)  MD Angela

## 2021-06-17 NOTE — PROGRESS NOTES
"Dear  Md Sima Rogers [address] [address]    Thank you for the opportunity to participate in the care of  Jeremy Martinez.    He is a 85 y.o. y/o who comes to the clinic because he needs a new PAP device.    The patient was diagnosed with MAMIE in 2011. A PSG test was completed on 8/24/2011 and showed and AHI of 61.3.   We found a copy of his previous PSG and it mentions that he was snoring and having witnessed apneas at that time. The patient himself does not recall any details from his previous PSG.    The patient reports that he was given a \"Z-pack\" machine after his test and he used it every night. He estimates that he ws using his device 4 hours per night. He feels that he was benefiting from using his machine. Unfortunately, he had some difficulties with his device approximately 12 months ago and he has been unable to use it because he did not know how to replace his device. He contacted several doctors to get a new device during this period but nobody knew how to do this.     The report from his PSG mentions a recommendation for a bi-level device with IPAP= 18 cwp, EPAP= 10 cwp, and oxygen supplementation at 2 L/min but he did not bring his device to clinic today.    Current DME provider:[facility]  Current device:unable to verify  Current settings:unable to verify.    Last time supplies were received:more than 12 months ago    Current AHI: unable to verify.  Current compliance: patient estimates a total of 4 hours per night.    Epworths Sleepiness Scale 5/2/2018   Sitting and reading 0   Watching TV 0   Sitting, inactive in a public place (e.g. a theatre or a meeting) 0   As a passenger in a car for an hour without a break 0   Lying down to rest in the afternoon when circumstances permit 2   Sitting and talking to someone 0   Sitting quietly after a lunch without alcohol 0   In a car, while stopped for a few minutes in traffic 0   Total score 2   Rooming 5/2/2018   Usual bedtime " 9:30-10pm   Sleep Latency varies   Awakenings 3 times   Wake Up Time 11am   Weekends same   Energy Drinks no   Coffee yes   Cola no   Difficulty falling asleep Yes   Difficulty staying asleep No   Excessive daytime tiredness Yes   Excessive daytime sleepiness Yes   Dozing off while driving No   Shift Worker No   Sleep Walking? No   Sleep Talking? Yes   Kicking or punching? No   Restless legs symptoms No         Past Medical History  Past Medical History:   Diagnosis Date     A-fib      Basal cell carcinoma      COPD (chronic obstructive pulmonary disease)      Heart disease      HLD (hyperlipidemia)      HTN (hypertension)      Lung cancer      MGUS (monoclonal gammopathy of unknown significance)      Myocardial infarction     age 38     Sleep apnea      Warthin's tumor     Left parotid        Past Surgical History  Past Surgical History:   Procedure Laterality Date     APPENDECTOMY       BASAL CELL CARCINOMA EXCISION  2009    back     CARDIOVERSION      x2     PORTACATH PLACEMENT  2012     RECTAL SURGERY      rectal fissure     THORACOTOMY Left 2012        Meds  Current Outpatient Prescriptions   Medication Sig Dispense Refill     acetaminophen (TYLENOL) 325 MG tablet Take 2 tablets (650 mg total) by mouth every 4 (four) hours as needed.  0     albuterol (PROAIR HFA;PROVENTIL HFA;VENTOLIN HFA) 90 mcg/actuation inhaler Inhale 2 puffs every 4 (four) hours as needed for wheezing.       albuterol (PROVENTIL) 2.5 mg /3 mL (0.083 %) nebulizer solution Take 2.5 mg by nebulization every 6 (six) hours as needed.        atorvastatin (LIPITOR) 10 MG tablet Take 10 mg by mouth at bedtime.       beclomethasone (QVAR) 80 mcg/actuation inhaler Inhale 1 puff 2 (two) times a day. 1 Inhaler 1     DIGOX 125 mcg tablet TAKE ONE TABLET BY MOUTH ONCE DAILY 90 tablet 1     diltiazem (CARDIZEM SR) 60 MG 12 hr capsule Take 60 mg by mouth 2 (two) times a day.       furosemide (LASIX) 40 MG tablet Take 1 tablet (40 mg total) by mouth 2 (two)  "times a day. (Patient taking differently: Take 40 mg by mouth daily. ) 60 tablet 3     insulin glargine (LANTUS SOLOSTAR U-100 INSULIN) 100 unit/mL (3 mL) pen Inject 22 units once daily in the morning. 5 adj dose pen 0     metFORMIN (GLUCOPHAGE) 500 MG tablet Take 1 tablet (500 mg total) by mouth daily with breakfast. 30 tablet 0     NOVOFINE PLUS 32 gauge x 1/6\" Ndle        ONETOUCH DELICA LANCETS 33 gauge Misc        ONETOUCH VERIO strips        VIT C/HAM AC/LUT/COPPER/ZNOX (VIT C-VIT E-COPPER-ZNOX-LUTEIN ORAL) Take 1 tablet by mouth 2 (two) times a day.        vitamins  A,C,E-zinc-copper 14,320-226-200 unit-mg-unit cap Take 1 capsule by mouth.       WARFARIN SODIUM (WARFARIN ORAL) Take 3 mg by mouth See Admin Instructions. 1.5mg on Tuesday and Thursday and 3mg on M, W, F, Sa, Sun       No current facility-administered medications for this visit.      Facility-Administered Medications Ordered in Other Visits   Medication Dose Route Frequency Provider Last Rate Last Dose     heparin 100 unit/mL lockflush (PF) porcine 300-600 Units  300-600 Units Intravenous PRN Jeremy Lockhart MD         sodium chloride 0.9 % flush 10 mL (NS)  10 mL Intravenous PRN Jeremy Lockhart MD            Allergies  Prednisone     Social History  Social History     Social History     Marital status:      Spouse name: N/A     Number of children: N/A     Years of education: N/A     Occupational History     Not on file.     Social History Main Topics     Smoking status: Former Smoker     Packs/day: 1.50     Years: 65.00     Quit date: 8/8/2011     Smokeless tobacco: Former User     Quit date: 8/8/2011      Comment: does occasionally have a cigarette when out drinking      Alcohol use 12.0 oz/week     20 Cans of beer per week      Comment: 2-3 beers a day     Drug use: No     Sexual activity: No     Other Topics Concern     Not on file     Social History Narrative        Family History  Family History   Problem Relation Age of Onset     " "Lung cancer Sister      Aortic aneurysm Brother            Review of Systems:  Constitutional: Negative except as noted in HPI.   Eyes: Negative except as noted in HPI.   ENT: Negative except as noted in HPI.   Cardiovascular: Negative except as noted in HPI.   Respiratory: Negative except as noted in HPI.   Gastrointestinal: Negative except as noted in HPI.   Genitourinary: Negative except as noted in HPI.   Musculoskeletal: Negative except as noted in HPI.   Integumentary: Negative except as noted in HPI.   Neurological: Negative except as noted in HPI.   Psychiatric: Negative except as noted in HPI.   Endocrine: Negative except as noted in HPI.   Hematologic/Lymphatic: Negative except as noted in HPI.      Physical Exam:  /48  Pulse 73  Ht 5' 2\" (1.575 m)  Wt 170 lb (77.1 kg)  SpO2 92%  BMI 31.09 kg/m2  BMI:Body mass index is 31.09 kg/(m^2).   GEN: NAD, obese  Head: Normocephalic.  EYES: PERRLA, EOMI  ENT: Oropharynx is clear, Mallampatti class IV airway. Uvula is edematous.  Nasal mucosa is pink  Neurological: Alert, oriented to time, place, and person.  Psych:  normal mood, normal affect     Labs/Studies:     Lab Results   Component Value Date    WBC 7.7 04/10/2018    HGB 12.8 (L) 04/10/2018    HCT 38.0 (L) 04/10/2018    MCV 92 04/10/2018     04/10/2018         Chemistry        Component Value Date/Time     04/10/2018 1330    K 3.9 04/10/2018 1330    CL 95 (L) 04/10/2018 1330    CO2 32 (H) 04/10/2018 1330    BUN 18 04/10/2018 1330    CREATININE 1.19 04/10/2018 1330     (HH) 04/10/2018 1330        Component Value Date/Time    CALCIUM 8.9 04/10/2018 1330    ALKPHOS 103 04/10/2018 1330    AST 13 04/10/2018 1330    ALT 19 04/10/2018 1330    BILITOT 1.9 (H) 04/10/2018 1330            Lab Results   Component Value Date    FERRITIN 371 (H) 03/05/2013     Lab Results   Component Value Date    TSH 4.1 03/05/2013     Lab Results   Component Value Date    HGBA1C 5.8 11/27/2017 "       Polysomnogram Reviewed- information summarized in the HPI.      Assessment and Plan:  In summary Jeremy Martinez is a 85 y.o. year old male here for consultation.      1. Obstructive sleep apnea.  Mr. Martinez was diagnosed with severe OSAH in 2011.  He used a form of PAP therapy for many years and it looks like he was benefiting from using his device based on his sleep quality.  I am unable to verify his efficacy or compliance results because he did not bring his device today and because he has not used it for some time.  He has a gap in usage but since this gap is related to his difficulties getting a new prescription, he may not need a new PSG. If his DME were to ask for a new PSG, this could be challenging because the patient does not think that he can repeat this test again.  He will bring his device to clinic today or tomorrow and I will verify the type of device and settings before placing an order.  He thinks that his current DME is [facility] but he is not 100% sure.     Patient verbalized understanding of these issues, agrees with the plan and all questions were answered today. Patient was given an opportuntity to voice any other symptoms or concerns not listed above. Patient did not have any other symptoms or concerns.      F/U to be determined after we check his device.     Ciro Zeng MD  [facility] Board Certified in Internal Medicine and Sleep Medicine  [facility].        -----  Patient brought his device to clinic and I was able to verify that he has a BiPAP- Auto device in S mode.  IPAP=19, EPAP= 10 cwp  No efficacy or compliance data was available.  There is an oxygen port.  The device indeed smells of vinegar.    We will order a new bi-level device.

## 2021-06-17 NOTE — PROGRESS NOTES
Order for Durable Medical Equipment was processed and equipment ordered.     DME provider: [facility]    Date Faxed: 5/2/18    Ordering Provider: Dr. Zeng    Equipment ordered: RESPIRONICS BiPAP IN S MODE

## 2021-06-17 NOTE — PROGRESS NOTES
Diabetic educator referral has been faxed to [facility] at [phone].  Diabetic educator is at that location on Wednesdays and I asked that they get him in at the earliest possible time slot.  His new primary care provider at [facility] is Dr Cb Cash and he is seeing him on Tuesday 4/24/18 at 1330.    Sylvie Nolasco RN 4/11/18 0738

## 2021-06-17 NOTE — PROGRESS NOTES
[facility] Hematology and Oncology Progress Note    Patient: Jeremy Martinez  MRN: [id number]  Date of Service: April 10, 2018      Assessment and Plan:    Malignant neoplasm of bronchus and lung, unspecified site    Primary site: Lung    Staging method: AJCC 7th Edition    Pathologic: Stage IIB (T3, N0, cM0) - Signed by Shahnaz Avendano CNP on 8/8/2014    Summary: Stage IIB (T3, N0, cM0)    1. Squamous cell carcinoma of the lung: Imaging was reviewed.  There is a question of recurrence in the left chest.  Possible invasion into the pulmonary artery.  We are going to get a PET/CT.  If this area is positive we may have to biopsy to confirm recurrence and get molecular markers.  I will see him in clinic in a few days after his PET scan.      2. Monoclonal gammopathy of undetermined significance:  M protein level remains stable and quite small.  Next labs due in October 2018.    3. Left-sided neck mass: Masses in the parotid and posterior to the thyroid are stable.  We did not do a CT of the next cycle.  Can evaluate on his PET scan.      4.  Hyperglycemia: Most certainly has type 2 diabetes.  He probably had hyperglycemia for quite some time.  He has not been into his primary care provider for a while.  I am going to start him on metformin 500 mg every morning.  We will get him into primary care ASAP.  We will also try to set him up with a diabetic educator at his primary care clinic.    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 Diagnosed 12/2011. Stage T3 N0 M0 with chest wall invasion.    Treatment:    Left lower lobe lobectomy and partial chest wall resection on 2/08/2012.   He had adjuvant chemotherapy with carboplatin and paclitaxel for 2 cycles followed by radiation completed in 6/2012 followed by 2 more cycles of carbo Taxol,  "finishing in 8/2012.    Interim History:    Jeremy returns today for follow-up visit.  He was last seen about 6 months ago.  He states that in the interim he is been generally feeling unwell.  He has noted some vision changes.  Decreased energy.  Appetite is been okay.  No lower extremity edema.  Denies worsening cough or shortness of breath.  No hemoptysis.    Review of Systems:    Constitutional  Constitutional (WDL): Exceptions to WDL  Fatigue: Fatigue not relieved by rest - Limiting instrumental ADL  Weight Loss: to <10% from baseline, intervention not indicated (down 10# since 10/2017)  Neurosensory  Neurosensory (WDL): Exceptions to WDL (\"water logged, floating\")  Ataxia: Asymptomatic, clinical or diagnostic observations only, intervention not indicated  Cardiovascular  Cardiovascular (WDL): All cardiovascular elements are within defined limits  Pulmonary  Respiratory (WDL): Exceptions to WDL (wheezing; BiPap is broken and does not have a new one yet)  Dyspnea: Shortness of breath with minimal exertion, limiting instrumental ADL  Gastrointestinal  Gastrointestinal (WDL): All gastrointestinal elements are within defined limits  Genitourinary  Genitourinary (WDL): Exceptions to WDL  Urinary Frequency: Present (with lasix)  Integumentary  Integumentary (WDL): All integumentary elements are within defined limits  Patient Coping  Patient Coping: Accepting  Accompanied by  Accompanied by: Family Member    Past History:    Past Medical History:   Diagnosis Date     A-fib      Basal cell carcinoma      COPD (chronic obstructive pulmonary disease)      Heart disease      HLD (hyperlipidemia)      HTN (hypertension)      Lung cancer      MGUS (monoclonal gammopathy of unknown significance)      Myocardial infarction     age 38     Sleep apnea      Warthin's tumor     Left parotid     Physical Exam:    Recent Vitals 4/10/2018   Height -   Weight 162 lbs 13 oz   BSA (m2) -   /62   Pulse 62   Temp 97.6   Temp src 1 "   SpO2 95   Some recent data might be hidden     General: patient appears stated age of 85 y.o.. Nontoxic and in no distress.   HEENT: Head: atraumatic, normocephalic. Sclerae anicteric.  Chest:  Normal respiratory effort  Cardiac:  No edema.   Abdomen: abdomen is soft, non-distended  Extremities: normal tone and muscle bulk.  Skin: no lesions or rash. Warm and dry.   CNS: alert and oriented x3. Grossly non-focal.   Psychiatric: normal mood and affect.     Lab Results:    Recent Results (from the past 168 hour(s))   POCT creatinine   Result Value Ref Range    POC Creatinine 0.9 mg/dL   POCT GFR   Result Value Ref Range    POC GFR AMER AF HE >60  >60 mL/min/1.73m2    POC GFR NON AMER AF >60  >60 mL/min/1.73m2   Comprehensive Metabolic Panel   Result Value Ref Range    Sodium 138 136 - 145 mmol/L    Potassium 3.9 3.5 - 5.0 mmol/L    Chloride 95 (L) 98 - 107 mmol/L    CO2 32 (H) 22 - 31 mmol/L    Anion Gap, Calculation 11 5 - 18 mmol/L    Glucose 539 (HH) 70 - 125 mg/dL    BUN 18 8 - 28 mg/dL    Creatinine 1.19 0.70 - 1.30 mg/dL    GFR MDRD Af Amer >60 >60 mL/min/1.73m2    GFR MDRD Non Af Amer 58 (L) >60 mL/min/1.73m2    Bilirubin, Total 1.9 (H) 0.0 - 1.0 mg/dL    Calcium 8.9 8.5 - 10.5 mg/dL    Protein, Total 6.4 6.0 - 8.0 g/dL    Albumin 3.5 3.5 - 5.0 g/dL    Alkaline Phosphatase 103 45 - 120 U/L    AST 13 0 - 40 U/L    ALT 19 0 - 45 U/L   HM1 (CBC with Diff)   Result Value Ref Range    WBC 7.7 4.0 - 11.0 thou/uL    RBC 4.15 (L) 4.40 - 6.20 mill/uL    Hemoglobin 12.8 (L) 14.0 - 18.0 g/dL    Hematocrit 38.0 (L) 40.0 - 54.0 %    MCV 92 80 - 100 fL    MCH 30.8 27.0 - 34.0 pg    MCHC 33.7 32.0 - 36.0 g/dL    RDW 14.5 11.0 - 14.5 %    Platelets 157 140 - 440 thou/uL    MPV 10.2 8.5 - 12.5 fL    Neutrophils % 77 (H) 50 - 70 %    Lymphocytes % 14 (L) 20 - 40 %    Monocytes % 7 2 - 10 %    Eosinophils % 2 0 - 6 %    Basophils % 0 0 - 2 %    Neutrophils Absolute 5.9 2.0 - 7.7 thou/uL    Lymphocytes Absolute 1.0 0.8 - 4.4  thou/uL    Monocytes Absolute 0.5 0.0 - 0.9 thou/uL    Eosinophils Absolute 0.2 0.0 - 0.4 thou/uL    Basophils Absolute 0.0 0.0 - 0.2 thou/uL      Imaging:    CT scan images were personally reviewed.  Possible progressive disease in the left mediastinum/pulmonary artery.    CT CHEST, ABDOMEN, AND PELVIS  4/6/2018 10:27 AM  COMPARISON: 11/26/2017. 10/06/2017.     FINDINGS:   CHEST: There is centrilobular emphysema. Status post left lower lobectomy. A confluent opacity with air bronchograms is again seen in the posterior left lung adjacent to the mediastinum. There is a 4 mm left upper lobe pulmonary nodule which has not   changed (series 3 image 75). There is a 4 mm right middle lobe nodule which has not changed (image 77). The heart is enlarged. There is a filling defect in the left pulmonary artery which is larger in size. There is atherosclerotic disease including   coronary artery calcification. A 12 mm right lower paratracheal lymph node has not changed in size. Otherwise no thoracic lymphadenopathy. There is a left chest wall Port-A-Cath with the tip in the upper right atrium.      ABDOMEN: There is splenomegaly. Normal pancreas and adrenal glands. Hepatic steatosis is present. There is cholelithiasis. No hydronephrosis or hydroureter. Normal stomach. Normal caliber of the small bowel. There is atherosclerotic disease. Enlargement   of the infrarenal abdominal aorta is noted, with a maximum AP diameter of 2.9 cm. No abdominal lymphadenopathy.     PELVIS: Normal urinary bladder. The prostate gland is normal in size. There is colonic diverticulosis. No diverticulitis. Normal appendix.     MUSCULOSKELETAL: There is a sclerotic left iliac wing lesion measuring 9 mm. A few paraspinal masses at the lower thorax have not significantly changed in size. Resection of the posterior left fifth, sixth, and seventh ribs is again noted.     IMPRESSION:   CONCLUSION:  1.  A filling defect in the left pulmonary artery has  increased in size. This may be bland or tumor thrombus.   2.  Otherwise no change from the prior study. A left posterior lung opacity has not changed. A 12 mm right lower paratracheal lymph node has not changed in size. Paraspinal masses have not changed. Unchanged small pulmonary nodules. Left lower lobectomy.  3.  Hepatic steatosis. Cholelithiasis. Atherosclerotic disease. Colonic diverticulosis. Emphysema.       Signed by: Jeremy Lockhart MD

## 2021-06-17 NOTE — PROGRESS NOTES
Patient is here today for labs and follow-up provider visit for his lung cancer.   49 y/o female no pmhx who presents for rash on the left leg. reports pain and burning in the area, vesicular lesions on erythematous base. no fever/chills. no recent travel. reports pain for 2 weeks prior to the onset of the rash

## 2021-06-17 NOTE — PROGRESS NOTES
[facility] Hematology and Oncology Progress Note    Patient: Jeremy Martinez  MRN: [id number]  Date of Service: May 1, 2018      Assessment and Plan:    Malignant neoplasm of bronchus and lung, unspecified site    Primary site: Lung    Staging method: AJCC 7th Edition    Pathologic: Stage IIB (T3, N0, cM0) - Signed by Shahnaz Avendano CNP on 8/8/2014    Summary: Stage IIB (T3, N0, cM0)    1. Squamous cell carcinoma of the lung: Comes in with a PET scan.  Images were reviewed.  No FDG avid lesions.  No evidence of recurrent lung cancer.  The opacity in the left pulmonary artery certainly could be thrombus.  He is already on warfarin.  Will not change anticoagulation at this point.  Repeat imaging per routine in 6 months.  If the area of the left pulmonary artery is enlarging may consider switching anticoagulation agents.     2. Monoclonal gammopathy of undetermined significance:  M protein level remains stable and quite small.  Next labs due in October 2018.    3. Left-sided neck mass: Masses in the parotid and posterior to the thyroid are stable.  Stability seen on PET scan.  No evidence of progression.     4.  Hyperglycemia: He has followed up with the diabetes educator and primary care    5.  COPD: I refilled his Qvar today.  He still has shortness of breath.  I offered a referral to pulmonary.  He wanted to hold off at this time.    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 Diagnosed 12/2011. Stage T3 N0 M0 with chest wall invasion.    Treatment:    Left lower lobe lobectomy and partial chest wall resection on 2/08/2012.   He had adjuvant chemotherapy with carboplatin and paclitaxel for 2 cycles followed by radiation completed in 6/2012 followed by 2 more cycles of carbo Taxol, finishing in 8/2012.    Interim History:    Jeremy returns today for  "follow-up visit.  He is here for short-term follow-up.  He got a PET scan to review a possible mass in the left pulmonary artery.  He still has some shortness of breath but this is chronic.  Otherwise no acute changes or complaints today.    Review of Systems:    Constitutional  Constitutional (WDL): Exceptions to WDL  Fatigue: Fatigue not relieved by rest - Limiting instrumental ADL  Weight Gain: 5 - <10% from baseline (6 lbs)  Neurosensory  Neurosensory (WDL): Exceptions to WDL  Peripheral Motor Neuropathy: Asymptomatic, clinical or diagnostic observations only, intervention not indicated  Ataxia: Asymptomatic, clinical or diagnostic observations only, intervention not indicated  Peripheral Sensory Neuropathy: Asymptomatic, loss of deep tendon reflexes or paresthesia (fingers and feet - \"no new\")  Cardiovascular  Cardiovascular (WDL): Exceptions to WDL (hx - a fib)  Pulmonary  Dyspnea: Shortness of breath with minimal exertion, limiting instrumental ADL  Gastrointestinal  Gastrointestinal (WDL): All gastrointestinal elements are within defined limits  Genitourinary  Genitourinary (WDL): Exceptions to WDL  Urinary Frequency: Present  Integumentary  Integumentary (WDL): All integumentary elements are within defined limits  Patient Coping  Patient Coping: Accepting  Accompanied by  Accompanied by: Family Member    Past History:    Past Medical History:   Diagnosis Date     A-fib      Basal cell carcinoma      COPD (chronic obstructive pulmonary disease)      Heart disease      HLD (hyperlipidemia)      HTN (hypertension)      Lung cancer      MGUS (monoclonal gammopathy of unknown significance)      Myocardial infarction     age 38     Sleep apnea      Warthin's tumor     Left parotid     Physical Exam:    Recent Vitals 5/1/2018   Height -   Weight 168 lbs   BSA (m2) -   /61   Pulse 78   Temp 97.7   Temp src 1   SpO2 93   Some recent data might be hidden     General: patient appears stated age of 85 y.o.. " Nontoxic and in no distress.   HEENT: Head: atraumatic, normocephalic. Sclerae anicteric.  Chest:  Normal respiratory effort  Cardiac:  No edema.   Abdomen: abdomen is non-distended  Extremities: normal tone and muscle bulk.  Skin: no lesions or rash. Warm and dry.   CNS: alert and oriented. Grossly non-focal.   Psychiatric: normal mood and affect.     Lab Results:    Recent Results (from the past 168 hour(s))   POCT Glucose   Result Value Ref Range    Glucose,  mg/dL      Imaging:    PET/CT scan images were personally reviewed.  No hypermetabolic lesions noted.    4/27/2018 1:45 PM     INDICATION: Lung cancer, left lower lobe, restaging. Subsequent treatment strategy  TECHNIQUE: Serum glucose level 137 mg/dL. One hour post intravenous administration of 10.8 mCi F-18 FDG, PET imaging was performed from the skull base to the mid thighs utilizing attenuation correction with concurrent axial CT and PET/CT image fusion.   Dose reduction techniques were used.  COMPARISON: PET/CT 10/09/2014. CT chest abdomen pelvis 04/06/2018, 10/6/2017 reviewed.     FINDINGS: Postoperative changes left lower lobectomy and posterior left chest wall reconstruction with mesh. Stable bandlike consolidation and curvilinear opacities in the left lung with low-level inflammatory uptake. No evidence of local recurrence. No   focal uptake in the left pulmonary artery to correspond with the filling defect depicted on CT 04/06/2018 suggesting this represents bland thrombus. No FDG avid adenopathy. No suspicious focal skeletal uptake. Small paraspinal masses are not FDG avid.   Stable FDG avid left parotid nodule with moderate uptake (SUVmax 4.1, previously 4.6).     Generalized cerebral volume loss. Left subclavian Port-A-Cath with tip near the SVC/RA junction. Mild cardiac enlargement. Tiny right pleural effusion. Mild scattered linear scarring and/or atelectasis both lungs. Diffuse hepatic steatosis.   Cholelithiasis. Infrarenal abdominal  aortic aneurysm measuring up to 3 cm, unchanged. Mild prostate gland enlargement. Moderate colonic diverticulosis. Moderate scattered degenerative changes in the spine.    CONCLUSION:  No evidence of recurrent or metastatic lung cancer.      Signed by: Jeremy Lockhart MD

## 2021-06-18 NOTE — PROGRESS NOTES
Mohsen came to chemo infusion this afternoon for a port flush only.  He reports feeling well.  Port accessed with good blood return.  Port flushed with ns, then heparinized.  Port needle deaccessed and site covered.  Mohsen d/c from clinic ambulatory.  He will return in 4-6 weeks for port flush.

## 2021-06-19 NOTE — LETTER
Letter by Zaheer Miller MD (Ted) at      Author: Zaheer Miller MD (Ted) Service: -- Author Type: --    Filed:  Encounter Date: 3/20/2019 Status: (Other)         Jeremy Martinez  [address]  [place] [address]      March 20, 2019      Dear Jeremy,    This letter is to remind you that you will be due for your follow up appointment with Dr. Stan Miller  . To help ensure you are in the best health possible, a regular follow-up with your cardiologist is essential.     Please call our Patient Scheduling Line at [phone] to schedule your appointment at your earliest convenience.  If you have recently scheduled an appointment, please disregard this letter.    We look forward to seeing you again. As always, we are available at the number  above for any questions or concerns you may have.      Sincerely,     The Physicians and Staff of [facility]

## 2021-06-21 NOTE — ANESTHESIA CARE TRANSFER NOTE
Last vitals:   Vitals:    10/17/18 1542   BP: 130/60   Pulse: (!) 116   Resp: 18   Temp: 37.3  C (99.2  F)   SpO2: 98%     Patient's level of consciousness is awake  Spontaneous respirations: yes  Maintains airway independently: yes  Dentition unchanged: yes  Oropharynx: oropharynx clear of all foreign objects    QCDR Measures:  ASA# 20 - Surgical Safety Checklist: WHO surgical safety checklist completed prior to induction  PQRS# 430 - Adult PONV Prevention: 4558F - Pt received => 2 anti-emetic agents (different classes) preop & intraop  ASA# 8 - Peds PONV Prevention: 4558F - Pt received => 2 anti-emetic agents (different classes) preop & intraop  PQRS# 424 - Ana-op Temp Management: 4559F - At least one body temp DOCUMENTED => 35.5C or 95.9F within required timeframe  PQRS# 426 - PACU Transfer Protocol: - Transfer of care checklist used  ASA# 14 - Acute Post-op Pain: ASA14B - Patient did NOT experience pain >= 7 out of 10

## 2021-06-21 NOTE — PROGRESS NOTES
"Cardiology Progress Note    Assessment:  Permanent atrial fibrillation, on warfarin, good rate control  History of atrial flutter, status post ablation in 2011  Heart failure with preserved ejection fraction,  IV fluid overload  Severe COPD  History of squamous cell carcinoma of the lung in 2011  Obstructive sleep apnea  Hyperlipidemia        Plan:  Check BMP and BNP today, adjust diuretics accordingly.  If BNP continues to be significantly elevated, will need to repeat echo  Follow-up based on results of the tests    Subjective:   This is 85 y.o. male who comes in today for follow-up visit.  He was recently admitted to hospital with abdominal pain.  He continues to feel short of breath but he denies worsening.  He has noticed mild swelling of the lower extremities.  He is unsure about weight gain.  He denies chest pains.  He has not had heart palpitations or syncope.  Review of Systems:   General: WNL  Eyes: Visual Distubance  Ears/Nose/Throat: WNL  Lungs: Short of breath  Heart: WNL  Stomach: WNL  Bladder: Frequent Urination at Night  Muscle/Joints: WNL  Skin: WNL  Nervous System: Loss of Balance  Mental Health: Confusion, Depression  Blood: WNL      Objective:   /56 (Patient Site: Left Arm, Patient Position: Sitting, Cuff Size: Adult Regular)   Pulse 88   Resp 24   Ht 5' 2\" (1.575 m)   Wt 160 lb (72.6 kg)   BMI 29.26 kg/m    Physical Exam:  GENERAL: Mild respiratory distress  NECK: Elevated JVD  LUNGS: Decreased breath sounds with few crackles at the bases  CARDIAC:irregular rhythm, S1 & S2 normal.  No heaves, thrills, gallops or murmurs.  ABDOMEN: flat, negative hepatosplenomegaly, soft and non-tender.  EXTREMITIES: No evidence of cyanosis, clubbing trace edema.    Current Outpatient Medications   Medication Sig Dispense Refill     acetaminophen (TYLENOL) 325 MG tablet Take 2 tablets (650 mg total) by mouth every 4 (four) hours as needed.  0     albuterol (PROAIR HFA;PROVENTIL HFA;VENTOLIN HFA) 90 " mcg/actuation inhaler Inhale 2 puffs every 4 (four) hours as needed for wheezing.       albuterol (PROVENTIL) 2.5 mg /3 mL (0.083 %) nebulizer solution Take 2.5 mg by nebulization every 6 (six) hours as needed.        atorvastatin (LIPITOR) 10 MG tablet Take 10 mg by mouth at bedtime.       beclomethasone (QVAR) 80 mcg/actuation inhaler Inhale 1 puff 2 (two) times a day. 1 Inhaler 1     DIGOX 125 mcg tablet TAKE 1 TABLET BY MOUTH ONCE DAILY DUE  TO  SEE  DR SILVA 90 tablet 0     diltiazem (CARDIZEM SR) 60 MG 12 hr capsule Take 60 mg by mouth 2 (two) times a day.       furosemide (LASIX) 40 MG tablet Take 1 tablet (40 mg total) by mouth 2 (two) times a day. 60 tablet 3     insulin glargine (LANTUS SOLOSTAR U-100 INSULIN) 100 unit/mL (3 mL) pen Inject 16 units once daily in the morning. 5 adj dose pen 0     metFORMIN (GLUCOPHAGE) 500 MG tablet Take 1 tablet (500 mg total) by mouth daily with breakfast. 30 tablet 0     predniSONE (DELTASONE) 20 MG tablet Take 20 mg by mouth daily.       VIT C/HAM AC/LUT/COPPER/ZNOX (VIT C-VIT E-COPPER-ZNOX-LUTEIN ORAL) Take 1 tablet by mouth 2 (two) times a day.        WARFARIN SODIUM (WARFARIN ORAL) Take 3-4.5 mg by mouth See Admin Instructions. 4.5 mg on Monday, 3 mg all other days       umeclidinium (INCRUSE ELLIPTA) 62.5 mcg/actuation DsDv inhaler Inhale 1 puff daily.       No current facility-administered medications for this visit.      Facility-Administered Medications Ordered in Other Visits   Medication Dose Route Frequency Provider Last Rate Last Dose     heparin 100 unit/mL lockflush (PF) porcine 300-600 Units  300-600 Units Intravenous PRN Jeremy Lockhart MD         sodium chloride 0.9 % flush 10 mL (NS)  10 mL Intravenous PRN Jeremy Lockhart MD           Cardiographics:      Echocardiogram: November 2017     Severe left atrial enlargement    Left ventricle ejection fraction is normal. The calculated left ventricular ejection fraction is 63% without wall motion  abnormality.    Aortic valve sclerosis with mild to moderate aortic regurgitation    Moderate mitral annular calcification. No mitral stenosis but mild regurgitation    Mild to moderate tricuspid regurgitation  When compared to the previous study dated 7/5/2016, no significant change identified      Holter: September 2016   Persistent atrial fibrillation with controlled ventricular response.      Stress Test: 2007   negative perfusion scan        Lab Results:       Lab Results   Component Value Date    CHOL 105 07/10/2018    CHOL 151 03/30/2017    CHOL 103 04/19/2016     Lab Results   Component Value Date    HDL 33 (L) 07/10/2018    HDL 32 (L) 03/30/2017    HDL 33 (L) 04/19/2016     Lab Results   Component Value Date    LDLCALC 47 07/10/2018    LDLCALC 67 03/30/2017    LDLCALC 49 04/19/2016     Lab Results   Component Value Date    TRIG 127 07/10/2018    TRIG 258 (H) 03/30/2017    TRIG 106 04/19/2016     BNP   Date Value Ref Range Status   10/19/2018 366 (H) 0 - 93 pg/mL Final       Zaheer (Stan)  MD Angela

## 2021-06-21 NOTE — PROGRESS NOTES
Received intake call for home oxygen at 2:25PM. Reviewed patient's chart; Patient qualifies under Medicare guidelines, but we are needing the order to be signed by the physician and face to face notes stating the need for home oxygen.     2:41PM - KELLEN GARBER - CALLED ZOE AND TOLD HIM WE NEED NOTES FROM PHYSICIAN STATING NEED FOR HOME O2 AND NEED PHYSICIAN TO SIGN ORDER. HE ASKED IF WE HAD SPOKE WITH THE DOCTOR AND I SAID NO, THE RT OR NURSE TYPICALLY DOES THAT. HE SAID HE WILL PAGE HER. I ASKED IF PATIENT IS AWARE HE IS NEEDING HOME O2, ZOE SAID YES HE TOLD HIM.  2:51 - KELLEN GARBER - CALLED FLOOR NURSE TO GET DISCHARGE TIME ESTIMATE. SHE SAID HE IS HOPING TO LEAVE BY 5:30PM.  3:09PM - RECEIVED NEEDED DOCUMENTS. CALLED PATIENT TO OFFER CHOICE HE IS OK WITH [facility]. EXPLAINED WE WOULD BE DELIVERING A TRANSPORT TANK TO HIS BEDSIDE AND THAT HE WOULD BE RECEIVING THE TANK SYSTEM LIKE HE HAD PREVIOUSLY. PATIENT WOULD LIKE 2 B TANKS. TOLD PATIENT HE WILL NEED TO CONTACT US WHEN HE IS LEAVING THE HOSPITAL. HE ASKED IF WE COULD BRING THE EQUIPMENT TOMORROW AND I SAID NO, HE SAID THEY HAVE PRESCRIPTIONS TO  AND ARE GETTING DINNER AFTER THEY LEAVE. I ASKED IF THEY COULD REARRANGED THEIR SCHEDULE BECAUSE IT IS IMPORTANT HE GETS HIS O2 SINCE THE TANK WON'T LAST ALL NIGHT AND HE SAID NO WE WILL HAVE TO WORK WITH THAT. PATIENT KNOWS TO CALL [facility] WHEN LEAVING DINNER TO GO HOME. TOLD PATIENT TRANSPORT TANK WILL BE TO BEDSIDE WITHIN TWO HOURS.

## 2021-06-21 NOTE — ANESTHESIA PREPROCEDURE EVALUATION
Anesthesia Evaluation      Patient summary reviewed   No history of anesthetic complications     Airway   Mallampati: III  Neck ROM: full   Pulmonary    (+) COPD, sleep apnea, wheezes,   (-) not a smoker (Former smoker)    ROS comment: Hx lung Ca s/p surgical resection.                         Cardiovascular - normal exam  (+) hypertension, past MI, dysrhythmias (Chronic A-Fib), CHF (Compensated), ,     ECG reviewed (A-Fib (82). Inferior infarct.)     ROS comment:   Severe left atrial enlargement    Left ventricle ejection fraction is normal. The calculated left ventricular ejection fraction is 63% without wall motion abnormality.    Aortic valve sclerosis with mild to moderate aortic regurgitation    Moderate mitral annular calcification. No mitral stenosis but mild regurgitation    Mild to moderate tricuspid regurgitation    When compared to the previous study dated 7/5/2016, no significant change identified     Neuro/Psych - negative ROS     Endo/Other    (+) diabetes mellitus, obesity (BMI 30),      Comments: Hx left parotid Warthin's tumor.    Monoclonal gammopathy of unknown significance.    GI/Hepatic/Renal    (+)   chronic renal disease (Nephrlithiasis.),   (-) GERD, impaired hepatic function     Other findings:     85-year-old male with history of CAD, CHF, atrial fibrillation on Coumadin, diabetes, history of lung cancer status post surgical resection, COPD presenting with abdominal pain for 2 days. CT showed right UPJ stone with moderate hydronephrosis.     Right flank pain, right UPJ stone with moderate hydronephrosis.  Pain control.  Urology consultation.  Urinalysis is unremarkable, no hematuria.     Cholelithiasis without elevation of liver enzymes, no right upper quadrant abdominal pain.     History of lung cancer status post resection, with port catheter still in place.     H/o atrial fibrillation on Coumadin and INR is therapeutic.      Stable soft tissue masses in the lower paraspinal thoracic  region.     Diabetes type 2 on insulin uncontrolled with some mild hyperglycemia.       INR 2.51, K 3.7, Cr 1.84, Hgb 11.4, Plts 156      Dental    (+) upper dentures and edentulous                       Anesthesia Plan  Planned anesthetic: general endotracheal  Duoneb preop  Glidescope intubation  4% Xylocaine LTA  Etomidate/Propofol for induction  Sux for intubation with Zemuron pretreatment  Metoprolol available for induction  Zofran.  No decadron.  Sugammadex PRN  ASA 4   Induction: intravenous   Anesthetic plan and risks discussed with: patient and spouse  Anesthesia plan special considerations: video-assisted, antiemetics,   Post-op plan: routine recovery

## 2021-06-21 NOTE — ANESTHESIA POSTPROCEDURE EVALUATION
Patient: Jeremy Martinez  CYSTOSCOPY, RIGHT RETROGRADE, BALLOON DILATION OF RIGHT URETER, RIGHT URETERAL STONE MANIPULATION AND RIGHT URETERAL STENT INSERTION  Anesthesia type: general    Patient location: PACU  Last vitals:   Vitals:    10/17/18 1745   BP: 125/63   Pulse: 92   Resp: 19   Temp:    SpO2: 95%     Post vital signs: stable  Level of consciousness: awake and responds to simple questions  Post-anesthesia pain: pain controlled  Post-anesthesia nausea and vomiting: no  Pulmonary: unassisted, return to baseline  Cardiovascular: stable and blood pressure at baseline  Hydration: adequate  Anesthetic events: no    QCDR Measures:  ASA# 11 - Ana-op Cardiac Arrest: ASA11B - Patient did NOT experience unanticipated cardiac arrest  ASA# 12 - Ana-op Mortality Rate: ASA12B - Patient did NOT die  ASA# 13 - PACU Re-Intubation Rate: ASA13B - Patient did NOT require a new airway mgmt  ASA# 10 - Composite Anes Safety: ASA10A - No serious adverse event    Additional Notes:

## 2021-06-22 NOTE — PROGRESS NOTES
Jeremy Martinez, 85 y.o., male, arrived to Chemo Infusion at 1400 for port flush. Port easily accessed, heparin withdrawn, positive blood return and flushed with 20ml Normal Saline and 600 units Heparin. Port then deaccessed and site covered with 2x2 gauze and paper tape.Jeremy Martinez discharged to [facility] at 1415 ambulatory and stable.

## 2021-06-22 NOTE — PROGRESS NOTES
[facility] Hematology and Oncology Progress Note    Patient: Jeremy Martinez  MRN: [id number]  Date of Service:  November 2, 2018      Assessment and Plan:    Malignant neoplasm of bronchus and lung, unspecified site    Primary site: Lung    Staging method: AJCC 7th Edition    Pathologic: Stage IIB (T3, N0, cM0) - Signed by Shahnaz Avendano CNP on 8/8/2014    Summary: Stage IIB (T3, N0, cM0)    1. Squamous cell carcinoma of the lung: He had a CT of the chest done today.  Generally stable and compared to April of this year.  Clinically he is doing about the same.  Still with shortness of breath and cough.  He is 6 years out from completion of his chemotherapy.  He is unlikely to recur at this point.  We will see him back in clinic in 4 months.    2. Monoclonal gammopathy of undetermined significance: M protein remained stable and quite small.  We will not check this anymore.      3. Left-sided neck mass: Masses in the parotid and posterior to the thyroid.  Has been stable and imaging.  Negative on PET scan from April 2018.     4.  COPD: He has been out of his Qvar for a while.  He is not sure how long.  I refilled it today.    ECOG Performance   ECOG Performance Status: 2    Distress Assessment  Distress Assessment Score: 4    Pain  Currently in Pain: No/denies    Diagnosis:    Stage IIIB squamous cell carcinoma of the lung. Diagnosed 12/2011. Stage T3 N0 M0 with chest wall invasion.    Treatment:    Left lower lobe lobectomy and partial chest wall resection on 2/08/2012.   He had adjuvant chemotherapy with carboplatin and paclitaxel for 2 cycles followed by radiation completed in 6/2012 followed by 2 more cycles of carbo Taxol, finishing in 8/2012.    Interim History:    Jeremy returns today for follow-up visit.  Generally okay.  He is been out of his Qvar for a while.  Short of breath and coughing.  No hemoptysis.  No chest pain or headache.    Review of Systems:    Constitutional  Constitutional (WDL):  Exceptions to WDL  Fatigue: Fatigue not relieved by rest - Limiting instrumental ADL  Weight Loss: to <10% from baseline, intervention not indicated(8#)  Neurosensory  Neurosensory (WDL): Exceptions to WDL  Peripheral Motor Neuropathy: Asymptomatic, clinical or diagnostic observations only, intervention not indicated  Ataxia: Asymptomatic, clinical or diagnostic observations only, intervention not indicated  Peripheral Sensory Neuropathy: Asymptomatic, loss of deep tendon reflexes or paresthesia  Cardiovascular  Cardiovascular (WDL): Exceptions to WDL  Palpitations: Definition: A disorder characterized by inflammation of the muscle tissue of the heart.  Pulmonary  Respiratory (WDL): Exceptions to WDL  Cough: Mild symptoms, nonprescription intervention indicated  Dyspnea: Shortness of breath with minimal exertion, limiting instrumental ADL  Gastrointestinal  Gastrointestinal (WDL): All gastrointestinal elements are within defined limits  Constipation: Occasional or intermittent symptoms, occasional use of stool softeners, laxatives, dietary modification, or enema(occ)  Genitourinary  Genitourinary (WDL): Exceptions to WDL  Urinary Frequency: Present  Urinary Retention: Urinary, suprapubic or intermittent catheter placement not indicated, able to void with some residual  Urinary Tract Pain: Mild pain  Integumentary  Integumentary (WDL): All integumentary elements are within defined limits  Patient Coping  Patient Coping: Accepting  Accompanied by  Accompanied by: Family Member(wife)    Past History:    Past Medical History:   Diagnosis Date     A-fib (H)      Basal cell carcinoma      COPD (chronic obstructive pulmonary disease) (H)      Heart disease      HLD (hyperlipidemia)      HTN (hypertension)      Lung cancer (H)      MGUS (monoclonal gammopathy of unknown significance)      Myocardial infarction (H)     age 38     Sleep apnea      Warthin's tumor     Left parotid     Physical Exam:    Recent Vitals 11/17/2018    Height -   Weight -   BSA (m2) -   BP -   Pulse 76   Temp -   Temp src -   SpO2 99   Some recent data might be hidden     General: patient appears stated age of 85 y.o.. Nontoxic and in no distress.   HEENT: Head: atraumatic, normocephalic. Sclerae anicteric.  Chest:  Normal respiratory effort.  Diffuse wheezing expiratory.  Cardiac:  No edema.   Abdomen: abdomen is non-distended  Extremities: normal tone and muscle bulk.  Skin: no lesions or rash. Warm and dry.   CNS: alert and oriented. Grossly non-focal.   Psychiatric: normal mood and affect.     Lab Results:    Results for MINERVA LOWRY (MRN [id number]) as of 12/31/2018 17:59   Ref. Range 11/2/2018 13:33 11/2/2018 13:33   Sodium Latest Ref Range: 136 - 145 mmol/L 138    Potassium Latest Ref Range: 3.5 - 5.0 mmol/L 3.7    Chloride Latest Ref Range: 98 - 107 mmol/L 99    CO2 Latest Ref Range: 22 - 31 mmol/L 31    Anion Gap, Calculation Latest Ref Range: 5 - 18 mmol/L 8    BUN Latest Ref Range: 8 - 28 mg/dL 15    Creatinine Latest Ref Range: 0.70 - 1.30 mg/dL 0.91    GFR MDRD Af Amer Latest Ref Range: >60 mL/min/1.73m2 >60    GFR MDRD Non Af Amer Latest Ref Range: >60 mL/min/1.73m2 >60    Calcium Latest Ref Range: 8.5 - 10.5 mg/dL 8.7    AST Latest Ref Range: 0 - 40 U/L 15    ALT Latest Ref Range: 0 - 45 U/L 22    ALBUMIN Latest Ref Range: 3.5 - 5.0 g/dL 2.5 (L)    Protein, Total Latest Ref Range: 6.0 - 8.0 g/dL 5.4 (L) 5.1 (L)   Alkaline Phosphatase Latest Ref Range: 45 - 120 U/L 81    Bilirubin, Total Latest Ref Range: 0.0 - 1.0 mg/dL 1.6 (H)    Glucose Latest Ref Range: 70 - 125 mg/dL 160 (H)    % Beta Latest Ref Range: 10.0 - 17.0 %  12.5   Albumin % Latest Ref Range: 51.0 - 67.0 %  55.6   Alpha 1 Latest Ref Range: 0.1 - 0.3 g/dL  0.3   Alpha 1 % Latest Ref Range: 2.0 - 4.0 %  5.2 (H)   Alpha 2 Latest Ref Range: 0.4 - 0.9 g/dL  0.7   Alpha 2 % Latest Ref Range: 5.0 - 13.0 %  14.7 (H)   Beta Latest Ref Range: 0.7 - 1.2 g/dL  0.6 (L)   ELP Comment Unknown  Very  small monocl...   Gamma Globulin Latest Ref Range: 0.6 - 1.4 g/dL  0.6   Gamma Globulin % Latest Ref Range: 9.0 - 20.0 %  12.0   Monoclonal Peak Latest Units: g/dL  0.1   WBC Latest Ref Range: 4.0 - 11.0 thou/uL 12.4 (H)    RBC Latest Ref Range: 4.40 - 6.20 mill/uL 4.12 (L)    Hemoglobin Latest Ref Range: 14.0 - 18.0 g/dL 12.3 (L)    Hematocrit Latest Ref Range: 40.0 - 54.0 % 36.7 (L)    MCV Latest Ref Range: 80 - 100 fL 89    MCH Latest Ref Range: 27.0 - 34.0 pg 29.9    MCHC Latest Ref Range: 32.0 - 36.0 g/dL 33.5    RDW Latest Ref Range: 11.0 - 14.5 % 15.6 (H)    Platelets Latest Ref Range: 140 - 440 thou/uL 128 (L)    MPV Latest Ref Range: 8.5 - 12.5 fL 10.5    Neutrophils % Latest Ref Range: 50 - 70 % 86 (H)    Lymphocytes % Latest Ref Range: 20 - 40 % 4 (L)    Monocytes % Latest Ref Range: 2 - 10 % 9    Eosinophils % Latest Ref Range: 0 - 6 % 1    Basophils % Latest Ref Range: 0 - 2 % 0    Neutrophils Absolute Latest Ref Range: 2.0 - 7.7 thou/uL 10.4 (H)    Lymphocytes Absolute Latest Ref Range: 0.8 - 4.4 thou/uL 0.5 (L)    Monocytes Absolute Latest Ref Range: 0.0 - 0.9 thou/uL 1.1 (H)    Eosinophils Absolute Latest Ref Range: 0.0 - 0.4 thou/uL 0.1    Basophils Absolute Latest Ref Range: 0.0 - 0.2 thou/uL 0.0      Imaging:    CT CHEST W CONTRAST  11/2/2018 3:22 PM  COMPARISON: PET CT 4/27/2018, chest CT 4/6/2018.     FINDINGS:  LUNGS AND PLEURA: There is volume loss in left hemithorax. The patient's had a left lower lobe resection. There is some consolidation with air bronchograms in the posterior left lung that is stable and likely related to prior surgery. There has been   reconstruction of a portion of the posterior left chest wall. In the right lung there is peripheral interstitial lung disease in the apex with some emphysema. There is a 4 mm nodule in the left lung on image 72 that is stable, there is a 4 mm right   middle lobe nodule on image 76 that is stable.      MEDIASTINUM: The heart is prominent.  There are mediastinal lymph nodes that are numerous but within the range of normal in size. A pretracheal node on image 21 measures 9 mm in diameter. Again seen is a prominent filling defect in the left pulmonary   artery similar in size to prior study. The differential would include thrombus or tumor but it appears relatively stable. It blends with the adjacent consolidated lung in the posterior left hemithorax.     LIMITED UPPER ABDOMEN: Low dense lesion left lobe of liver measuring 9 mm in diameter is stable and likely a cyst. There is cholelithiasis. The spleen is prominent. The adrenals and pancreas appear unremarkable. There is a stent in the right kidney.     MUSCULOSKELETAL: There are 2 paraspinal densities to the right of the spine visible in the lower thorax. One on image 44 series 2 measures 3.1 x 2.1 cm similar to the prior study and one inferior to it on image 50 measures 2.2 cm in maximum diameter also   similar to the prior study.    CONCLUSION:  1.  There is a filling defect in the left pulmonary artery that is similar in size to the prior study. The differential would include thrombus or tumor. The left lower lobe has been resected.     2.  There has been a left lower lobe resection and a left posterior chest wall reconstruction. There is consolidated lung with air bronchograms in the posterior left chest adjacent to the reconstructed chest wall that is similar to the prior study.     3.  There are mediastinal lymph nodes that are normal in size and stable. There are 2 right paraspinal soft tissue densities that are stable compared to prior study but are indeterminate.     4.  There are small pulmonary nodules that are unchanged.     5.  Cholelithiasis, right renal stent.      Signed by: Jeremy Lockhart MD

## 2021-06-23 NOTE — TELEPHONE ENCOUNTER
Monthly follow up phone call for the COPD Program. Jeremy said he had no time to talk to me today. Will continue with calls.

## 2021-06-23 NOTE — TELEPHONE ENCOUNTER
3 Day follow up phone call for the COPD Program. No answer. Left message with call back number for any questions or concerns.

## 2021-06-23 NOTE — TELEPHONE ENCOUNTER
1st monthly phone call for the COPD Program. No answer. Left message with call back number for any questions or concerns.

## 2021-06-23 NOTE — TELEPHONE ENCOUNTER
7 Day follow up phone call for the COPD Program. No answer. Left message with call back number for any questions or concerns. Will continue with calls.

## 2021-06-23 NOTE — TELEPHONE ENCOUNTER
COPD Education    Called patient and left message. Told patient to call if Jeremy had any questions and that we would call again next on Monday 1/13/19.   Our phone number is [phone].    SERGIO Kwan, COPD educator

## 2021-06-24 NOTE — PROGRESS NOTES
[facility] Hematology and Oncology Progress Note    Patient: Jeremy Martinez  MRN: [id number]  Date of Service:  March 5, 2019       Assessment and Plan:    Malignant neoplasm of bronchus and lung, unspecified site    Primary site: Lung    Staging method: AJCC 7th Edition    Pathologic: Stage IIB (T3, N0, cM0) - Signed by Shahnaz Avendano CNP on 8/8/2014    Summary: Stage IIB (T3, N0, cM0)    1. Squamous cell carcinoma of the lung: CT scan of the chest from yesterday was reviewed.  No evidence of recurrent disease.  No evidence of pneumonia or effusion.  He is now 7 years out from his initial surgery.  Likelihood of recurrence at this point is low.  We will see him back in 6 months with imaging.      2. Left-sided neck mass: We will recheck imaging when he returns in 6 months.    3.  COPD: He has audible wheezing today.  He was discharged from the hospital on January 10.  He is noncompliant with his nebulizers and inhalers.  I stressed to him to start the Qvar 1 puff twice a day and try to do his nebulizers twice a day.      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 Diagnosed 12/2011. Stage T3 N0 M0 with chest wall invasion.    Treatment:    Left lower lobe lobectomy and partial chest wall resection on 2/08/2012.   He had adjuvant chemotherapy with carboplatin and paclitaxel for 2 cycles followed by radiation completed in 6/2012 followed by 2 more cycles of carbo Taxol, finishing in 8/2012.    Interim History:    Jeremy returns today for follow-up visit.  He was seen in clinic about 4 months ago.  He was hospitalized for influenza in January of this year.  He is not taking his Qvar twice a day, mostly once a day.  Not doing nebs much either.  Having shortness of breath on exertion.  No pain.  No worsening cough or hemoptysis.    Review of  Systems:    Constitutional  Constitutional (WDL): Exceptions to WDL  Fatigue: Fatigue relieved by rest  Neurosensory  Neurosensory (WDL): Exceptions to WDL  Ataxia: Asymptomatic, clinical or diagnostic observations only, intervention not indicated  Peripheral Sensory Neuropathy: Asymptomatic, loss of deep tendon reflexes or paresthesia(hands feet)  Cardiovascular  Cardiovascular (WDL): Exceptions to WDL  Edema: Yes(occ)  Pulmonary  Respiratory (WDL): Exceptions to WDL  Cough: Mild symptoms, nonprescription intervention indicated(occ)  Dyspnea: Shortness of breath with minimal exertion, limiting instrumental ADL  Gastrointestinal  Gastrointestinal (WDL): All gastrointestinal elements are within defined limits  Genitourinary  Genitourinary (WDL): All genitourinary elements are within defined limits  Integumentary  Integumentary (WDL): All integumentary elements are within defined limits  Patient Coping  Patient Coping: Accepting  Accompanied by  Accompanied by: Family Member(wife)    Past History:    Past Medical History:   Diagnosis Date     A-fib (H)      Basal cell carcinoma      COPD (chronic obstructive pulmonary disease) (H)      Heart disease      HLD (hyperlipidemia)      HTN (hypertension)      Lung cancer (H)      MGUS (monoclonal gammopathy of unknown significance)      Myocardial infarction (H)     age 38     Sleep apnea      Warthin's tumor     Left parotid     Physical Exam:    Recent Vitals 3/5/2019   Height -   Weight 163 lbs 11 oz   BSA (m2) 1.8 m2   /70   Pulse 80   Temp 97.9   Temp src 1   SpO2 91   Some recent data might be hidden     General: patient appears stated age of 86 y.o.. Nontoxic and in no distress.   HEENT: Head: atraumatic, normocephalic. Sclerae anicteric.  Chest:  Normal respiratory effort.  Cardiac:  No edema.   Abdomen: abdomen is non-distended  Extremities: normal tone and muscle bulk.  Skin: no lesions or rash. Warm and dry.   CNS: alert and oriented. Grossly non-focal.    Psychiatric: normal mood and affect.     Lab Results:    Recent Results (from the past 240 hour(s))   POCT creatinine    Collection Time: 03/04/19  1:13 PM   Result Value Ref Range    POC Creatinine 0.9 mg/dL   POCT GFR    Collection Time: 03/04/19  1:16 PM   Result Value Ref Range    POC GFR AMER AF HE >60  >60 mL/min/1.73m2    POC GFR NON AMER AF >60  >60 mL/min/1.73m2   Comprehensive Metabolic Panel    Collection Time: 03/05/19  2:59 PM   Result Value Ref Range    Sodium 143 136 - 145 mmol/L    Potassium 4.2 3.5 - 5.0 mmol/L    Chloride 101 98 - 107 mmol/L    CO2 34 (H) 22 - 31 mmol/L    Anion Gap, Calculation 8 5 - 18 mmol/L    Glucose 139 (H) 70 - 125 mg/dL    BUN 16 8 - 28 mg/dL    Creatinine 0.85 0.70 - 1.30 mg/dL    GFR MDRD Af Amer >60 >60 mL/min/1.73m2    GFR MDRD Non Af Amer >60 >60 mL/min/1.73m2    Bilirubin, Total 1.8 (H) 0.0 - 1.0 mg/dL    Calcium 8.9 8.5 - 10.5 mg/dL    Protein, Total 6.0 6.0 - 8.0 g/dL    Albumin 3.6 3.5 - 5.0 g/dL    Alkaline Phosphatase 66 45 - 120 U/L    AST 14 0 - 40 U/L    ALT 17 0 - 45 U/L   HM1 (CBC with Diff)    Collection Time: 03/05/19  2:59 PM   Result Value Ref Range    WBC 10.4 4.0 - 11.0 thou/uL    RBC 3.69 (L) 4.40 - 6.20 mill/uL    Hemoglobin 11.4 (L) 14.0 - 18.0 g/dL    Hematocrit 35.6 (L) 40.0 - 54.0 %    MCV 97 80 - 100 fL    MCH 30.9 27.0 - 34.0 pg    MCHC 32.0 32.0 - 36.0 g/dL    RDW 18.1 (H) 11.0 - 14.5 %    Platelets 183 140 - 440 thou/uL    MPV 9.3 8.5 - 12.5 fL    Neutrophils % 91 (H) 50 - 70 %    Lymphocytes % 6 (L) 20 - 40 %    Monocytes % 3 2 - 10 %    Eosinophils % 0 0 - 6 %    Basophils % 0 0 - 2 %    Neutrophils Absolute 9.3 (H) 2.0 - 7.7 thou/uL    Lymphocytes Absolute 0.6 (L) 0.8 - 4.4 thou/uL    Monocytes Absolute 0.3 0.0 - 0.9 thou/uL    Eosinophils Absolute 0.0 0.0 - 0.4 thou/uL    Basophils Absolute 0.0 0.0 - 0.2 thou/uL     Imaging:    CT scan images personally reviewed.  Stable changes in the left lung.  No evidence of cancer  recurrence.    EXAM: CT CHEST W CONTRAST  LOCATION: [facility]  DATE/TIME: 3/4/2019 1:30 PM     FINDINGS:   LUNGS AND PLEURA: Post left lower lobectomy. Left perihilar treatment change is similar to prior. Stable emphysema. Airway thickening noted. Stable small pulmonary nodules (left lower lobe series 4, image 151 and middle lobe image 137). No pneumothorax.     MEDIASTINUM: A few lymph nodes show slightly improved size to prior, including pretracheal node measuring 6 mm short axis versus 10 mm previously (series 4, image 82). Redemonstrated right pulmonary arterial thrombus extending, similar in appearance.   Portacatheter. Cardiomegaly.     LIMITED UPPER ABDOMEN: Stable hypodense left adrenal nodule. Stable hepatic hypodensities. Incompletely seen prominent spleen. Cholelithiasis.     MUSCULOSKELETAL: No significant change of paraspinal densities with the largest measuring 22 x 32 mm on the right (series 4, image 179). Surgical changes left chest wall.    CONCLUSION:      1.  A few mediastinal nodes show slight improved size.      2.  Otherwise, no significant change to prior.       Signed by: Jeremy Lockhart MD

## 2021-06-24 NOTE — TELEPHONE ENCOUNTER
COPD Education    Called patient and left message. Told patient to call if he had any questions and that we would call again next month.   Our phone number is [phone].    SERGIO Kwan, COPD educator

## 2021-07-03 NOTE — ADDENDUM NOTE
Addendum Note by Ciro Zeng MD at 5/2/2018  1:43 PM     Author: Ciro Zeng MD Service: -- Author Type: Physician    Filed: 5/2/2018  1:43 PM Encounter Date: 5/2/2018 Status: Signed    : Ciro Zeng MD (Physician)    Addended by: CIRO ZENG on: 5/2/2018 01:43 PM        Modules accepted: Orders

## 2021-07-03 NOTE — ADDENDUM NOTE
Addendum Note by Sylvie Brito RN at 4/11/2018 11:18 AM     Author: Sylvie Brito RN Service: -- Author Type: Registered Nurse    Filed: 4/11/2018 11:18 AM Encounter Date: 4/10/2018 Status: Signed    : Sylvie Brito RN (Registered Nurse)    Addended by: SYLVIE BRITO on: 4/11/2018 11:18 AM        Modules accepted: Orders